# Patient Record
Sex: MALE | Race: WHITE | NOT HISPANIC OR LATINO | Employment: UNEMPLOYED | URBAN - METROPOLITAN AREA
[De-identification: names, ages, dates, MRNs, and addresses within clinical notes are randomized per-mention and may not be internally consistent; named-entity substitution may affect disease eponyms.]

---

## 2019-02-02 ENCOUNTER — HOSPITAL ENCOUNTER (OUTPATIENT)
Facility: OTHER | Age: 59
Discharge: HOME | End: 2019-02-03
Attending: EMERGENCY MEDICINE | Admitting: HOSPITALIST

## 2019-02-02 DIAGNOSIS — R06.00 DYSPNEA: ICD-10-CM

## 2019-02-02 DIAGNOSIS — E78.2 MIXED HYPERLIPIDEMIA: ICD-10-CM

## 2019-02-02 DIAGNOSIS — I50.21 ACUTE SYSTOLIC CONGESTIVE HEART FAILURE: Primary | ICD-10-CM

## 2019-02-02 DIAGNOSIS — I10 ESSENTIAL HYPERTENSION: ICD-10-CM

## 2019-02-02 DIAGNOSIS — Z72.0 TOBACCO ABUSE: ICD-10-CM

## 2019-02-02 DIAGNOSIS — I50.9 CONGESTIVE HEART FAILURE, UNSPECIFIED HF CHRONICITY, UNSPECIFIED HEART FAILURE TYPE: ICD-10-CM

## 2019-02-02 LAB
ALBUMIN SERPL BCP-MCNC: 3.7 G/DL
ALP SERPL-CCNC: 72 U/L
ALT SERPL W/O P-5'-P-CCNC: 33 U/L
ANION GAP SERPL CALC-SCNC: 10 MMOL/L
AST SERPL-CCNC: 51 U/L
BASOPHILS # BLD AUTO: 0.02 K/UL
BASOPHILS NFR BLD: 0.2 %
BILIRUB SERPL-MCNC: 0.6 MG/DL
BNP SERPL-MCNC: 1916 PG/ML
BUN SERPL-MCNC: 18 MG/DL
CALCIUM SERPL-MCNC: 9.1 MG/DL
CHLORIDE SERPL-SCNC: 107 MMOL/L
CO2 SERPL-SCNC: 23 MMOL/L
CREAT SERPL-MCNC: 1.2 MG/DL
DIFFERENTIAL METHOD: ABNORMAL
EOSINOPHIL # BLD AUTO: 0.1 K/UL
EOSINOPHIL NFR BLD: 0.6 %
ERYTHROCYTE [DISTWIDTH] IN BLOOD BY AUTOMATED COUNT: 14.7 %
EST. GFR  (AFRICAN AMERICAN): >60 ML/MIN/1.73 M^2
EST. GFR  (NON AFRICAN AMERICAN): >60 ML/MIN/1.73 M^2
GLUCOSE SERPL-MCNC: 97 MG/DL
HCT VFR BLD AUTO: 38.6 %
HGB BLD-MCNC: 12.8 G/DL
LYMPHOCYTES # BLD AUTO: 1 K/UL
LYMPHOCYTES NFR BLD: 12.3 %
MCH RBC QN AUTO: 31.9 PG
MCHC RBC AUTO-ENTMCNC: 33.2 G/DL
MCV RBC AUTO: 96 FL
MONOCYTES # BLD AUTO: 0.2 K/UL
MONOCYTES NFR BLD: 3 %
NEUTROPHILS # BLD AUTO: 6.8 K/UL
NEUTROPHILS NFR BLD: 83.8 %
PLATELET # BLD AUTO: 251 K/UL
PMV BLD AUTO: 10.1 FL
POTASSIUM SERPL-SCNC: 3.9 MMOL/L
PROT SERPL-MCNC: 6.9 G/DL
RBC # BLD AUTO: 4.01 M/UL
SODIUM SERPL-SCNC: 140 MMOL/L
TROPONIN I SERPL DL<=0.01 NG/ML-MCNC: 0.05 NG/ML
TROPONIN I SERPL DL<=0.01 NG/ML-MCNC: 0.14 NG/ML
TROPONIN I SERPL DL<=0.01 NG/ML-MCNC: 0.17 NG/ML
WBC # BLD AUTO: 8.13 K/UL

## 2019-02-02 PROCEDURE — 83880 ASSAY OF NATRIURETIC PEPTIDE: CPT

## 2019-02-02 PROCEDURE — G0378 HOSPITAL OBSERVATION PER HR: HCPCS

## 2019-02-02 PROCEDURE — 85025 COMPLETE CBC W/AUTO DIFF WBC: CPT

## 2019-02-02 PROCEDURE — 96374 THER/PROPH/DIAG INJ IV PUSH: CPT

## 2019-02-02 PROCEDURE — 84484 ASSAY OF TROPONIN QUANT: CPT | Mod: 91

## 2019-02-02 PROCEDURE — 84484 ASSAY OF TROPONIN QUANT: CPT

## 2019-02-02 PROCEDURE — 80053 COMPREHEN METABOLIC PANEL: CPT

## 2019-02-02 PROCEDURE — 99219 PR INITIAL OBSERVATION CARE,LEVL II: CPT | Mod: ,,, | Performed by: HOSPITALIST

## 2019-02-02 PROCEDURE — 93010 EKG 12-LEAD: ICD-10-PCS | Mod: ,,, | Performed by: INTERNAL MEDICINE

## 2019-02-02 PROCEDURE — 36415 COLL VENOUS BLD VENIPUNCTURE: CPT

## 2019-02-02 PROCEDURE — 25000003 PHARM REV CODE 250: Performed by: HOSPITALIST

## 2019-02-02 PROCEDURE — 93010 ELECTROCARDIOGRAM REPORT: CPT | Mod: ,,, | Performed by: INTERNAL MEDICINE

## 2019-02-02 PROCEDURE — A4216 STERILE WATER/SALINE, 10 ML: HCPCS | Performed by: HOSPITALIST

## 2019-02-02 PROCEDURE — 93005 ELECTROCARDIOGRAM TRACING: CPT

## 2019-02-02 PROCEDURE — 94761 N-INVAS EAR/PLS OXIMETRY MLT: CPT

## 2019-02-02 PROCEDURE — 63600175 PHARM REV CODE 636 W HCPCS: Performed by: HOSPITALIST

## 2019-02-02 PROCEDURE — 99219 PR INITIAL OBSERVATION CARE,LEVL II: ICD-10-PCS | Mod: ,,, | Performed by: HOSPITALIST

## 2019-02-02 PROCEDURE — 63600175 PHARM REV CODE 636 W HCPCS: Performed by: EMERGENCY MEDICINE

## 2019-02-02 PROCEDURE — 99285 EMERGENCY DEPT VISIT HI MDM: CPT | Mod: 25

## 2019-02-02 RX ORDER — ATORVASTATIN CALCIUM 40 MG/1
40 TABLET, FILM COATED ORAL DAILY
COMMUNITY

## 2019-02-02 RX ORDER — ASPIRIN 81 MG/1
81 TABLET ORAL DAILY
COMMUNITY

## 2019-02-02 RX ORDER — SPIRONOLACTONE 25 MG/1
25 TABLET ORAL DAILY
Status: DISCONTINUED | OUTPATIENT
Start: 2019-02-02 | End: 2019-02-03 | Stop reason: HOSPADM

## 2019-02-02 RX ORDER — SODIUM CHLORIDE 0.9 % (FLUSH) 0.9 %
3 SYRINGE (ML) INJECTION EVERY 8 HOURS
Status: DISCONTINUED | OUTPATIENT
Start: 2019-02-02 | End: 2019-02-03 | Stop reason: HOSPADM

## 2019-02-02 RX ORDER — ASPIRIN 81 MG/1
81 TABLET ORAL DAILY
Status: DISCONTINUED | OUTPATIENT
Start: 2019-02-02 | End: 2019-02-03 | Stop reason: HOSPADM

## 2019-02-02 RX ORDER — CARVEDILOL 3.12 MG/1
3.12 TABLET ORAL 2 TIMES DAILY
Status: DISCONTINUED | OUTPATIENT
Start: 2019-02-02 | End: 2019-02-03 | Stop reason: HOSPADM

## 2019-02-02 RX ORDER — ONDANSETRON 2 MG/ML
4 INJECTION INTRAMUSCULAR; INTRAVENOUS EVERY 8 HOURS PRN
Status: DISCONTINUED | OUTPATIENT
Start: 2019-02-02 | End: 2019-02-03 | Stop reason: HOSPADM

## 2019-02-02 RX ORDER — FUROSEMIDE 10 MG/ML
40 INJECTION INTRAMUSCULAR; INTRAVENOUS
Status: COMPLETED | OUTPATIENT
Start: 2019-02-02 | End: 2019-02-02

## 2019-02-02 RX ORDER — SODIUM CHLORIDE 0.9 % (FLUSH) 0.9 %
5 SYRINGE (ML) INJECTION
Status: DISCONTINUED | OUTPATIENT
Start: 2019-02-02 | End: 2019-02-03 | Stop reason: HOSPADM

## 2019-02-02 RX ORDER — FUROSEMIDE 20 MG/1
20 TABLET ORAL 2 TIMES DAILY
COMMUNITY

## 2019-02-02 RX ORDER — ATORVASTATIN CALCIUM 20 MG/1
40 TABLET, FILM COATED ORAL DAILY
Status: DISCONTINUED | OUTPATIENT
Start: 2019-02-02 | End: 2019-02-03 | Stop reason: HOSPADM

## 2019-02-02 RX ORDER — ENOXAPARIN SODIUM 100 MG/ML
1 INJECTION SUBCUTANEOUS EVERY 12 HOURS
Status: DISCONTINUED | OUTPATIENT
Start: 2019-02-03 | End: 2019-02-03 | Stop reason: HOSPADM

## 2019-02-02 RX ORDER — SPIRONOLACTONE 25 MG/1
25 TABLET ORAL DAILY
COMMUNITY

## 2019-02-02 RX ORDER — FUROSEMIDE 10 MG/ML
40 INJECTION INTRAMUSCULAR; INTRAVENOUS 2 TIMES DAILY
Status: DISCONTINUED | OUTPATIENT
Start: 2019-02-02 | End: 2019-02-03 | Stop reason: HOSPADM

## 2019-02-02 RX ORDER — ENOXAPARIN SODIUM 100 MG/ML
40 INJECTION SUBCUTANEOUS EVERY 24 HOURS
Status: DISCONTINUED | OUTPATIENT
Start: 2019-02-02 | End: 2019-02-02

## 2019-02-02 RX ADMIN — Medication 3 ML: at 10:02

## 2019-02-02 RX ADMIN — CARVEDILOL 3.12 MG: 3.12 TABLET, FILM COATED ORAL at 03:02

## 2019-02-02 RX ADMIN — LOSARTAN POTASSIUM 12.5 MG: 25 TABLET, FILM COATED ORAL at 03:02

## 2019-02-02 RX ADMIN — SPIRONOLACTONE 25 MG: 25 TABLET ORAL at 03:02

## 2019-02-02 RX ADMIN — FUROSEMIDE 40 MG: 10 INJECTION, SOLUTION INTRAVENOUS at 05:02

## 2019-02-02 RX ADMIN — FUROSEMIDE 40 MG: 10 INJECTION, SOLUTION INTRAVENOUS at 07:02

## 2019-02-02 RX ADMIN — ENOXAPARIN SODIUM 40 MG: 100 INJECTION SUBCUTANEOUS at 05:02

## 2019-02-02 RX ADMIN — ATORVASTATIN CALCIUM 40 MG: 20 TABLET, FILM COATED ORAL at 03:02

## 2019-02-02 RX ADMIN — ASPIRIN 81 MG: 81 TABLET, COATED ORAL at 03:02

## 2019-02-02 RX ADMIN — CARVEDILOL 3.12 MG: 3.12 TABLET, FILM COATED ORAL at 09:02

## 2019-02-02 NOTE — ED NOTES
Assumed care of pt from DM Martinez. Pt resting in stretcher in NAD with even, unlabored respirations. Spo2 92% on RA, pt denies NC at this time. Urinal at bedside. Pt reports SOB improved, denies chest pain.

## 2019-02-02 NOTE — PLAN OF CARE
Problem: Adult Inpatient Plan of Care  Goal: Plan of Care Review  Outcome: Ongoing (interventions implemented as appropriate)  Patient arrived to room. Admit assessment completed. Plan of care discussed with patient. Provided with towels for shower.  Patient states that [place] currently.  Instructed that he is not allowed to smoke in the room, unit and cannot leave unit.  Will monitor

## 2019-02-02 NOTE — ED NOTES
"Pt states he is breathing better since urinating "all that fluid out". No obvious respiratory distress observed. +DUNCAN. Will continue to monitor closely.  "

## 2019-02-02 NOTE — ED NOTES
Since returning from x- ray, pt states he is breathing better and doesn't need the 02. Pt was informed his 02 sats are a little low and the 02 would benefit him. Pt still refused to use the 02. Dr. Goldman was notified.

## 2019-02-02 NOTE — ASSESSMENT & PLAN NOTE
Will give Lasix 40 mg IV q.12  Continue spironolactone 25 mg daily  Strict I/Os  Trend troponin I levels  Systolic CHF suspected, unable to obtain 2D echocardiogram for confirmation

## 2019-02-02 NOTE — ED NOTES
Spo2 91%, pt refusing to put oxygen on to improve sats. Pt in NAD with even, unlabored respirations.

## 2019-02-02 NOTE — ED PROVIDER NOTES
Encounter Date: 2/2/2019    SCRIBE #1 NOTE: IBree, am scribing for, and in the presence of, Dr. Saenz.       History     Chief Complaint   Patient presents with    Shortness of Breath     Pt came to the ED tonight c.o. shortness of breath x 2 days getting worse.      Time seen by provider: 4:58 AM    This is a 59 y.o. male who presents to the ED via EMS with complaint of shortness of breath for the past two days, worse tonight with activity and laying flat. Patient reports cough with sputum starting tonight and increased fatigue with ambulating. Patient denies fever, wheezing, chest pain or leg swelling. Patient has not been compliant with any medications. Patient reports history of similar symptoms with possible fluid on lungs and was administered fluid pills. Patient reports history of HTN and possibly borderline diabetic.       The history is provided by the patient.     Review of patient's allergies indicates:  No Known Allergies  No past medical history on file.  No past surgical history on file.  No family history on file.  Social History     Tobacco Use    Smoking status: Not on file   Substance Use Topics    Alcohol use: Not on file    Drug use: Not on file     Review of Systems   Constitutional: Positive for fatigue. Negative for fever.   HENT: Negative for sore throat.    Respiratory: Positive for cough and shortness of breath. Negative for wheezing.    Cardiovascular: Negative for chest pain and leg swelling.   Gastrointestinal: Negative for nausea.   Genitourinary: Negative for dysuria.   Musculoskeletal: Negative for back pain.   Skin: Negative for rash.   Neurological: Negative for weakness.   Hematological: Does not bruise/bleed easily.       Physical Exam     Initial Vitals [02/02/19 0400]   BP Pulse Resp Temp SpO2   (!) 159/99 (!) 116 20 97.5 °F (36.4 °C) 100 %      MAP       --         Physical Exam    Nursing note and vitals reviewed.  Constitutional: He appears well-developed  and well-nourished. He is not diaphoretic. No distress.   HENT:   Head: Normocephalic and atraumatic.   Eyes: Conjunctivae and EOM are normal.   Neck: Normal range of motion. Neck supple.   Cardiovascular: Regular rhythm and normal heart sounds. Tachycardia present.  Exam reveals no gallop and no friction rub.    No murmur heard.  Pulmonary/Chest: No respiratory distress. He has no wheezes. He has no rhonchi. He has no rales.   Decreased breath sounds diffusely, worse at lung bases.   Abdominal: Soft. Bowel sounds are normal. He exhibits no distension. There is no tenderness. There is no rebound and no guarding.   Musculoskeletal: Normal range of motion. He exhibits no edema or tenderness.   Neurological: He is alert and oriented to person, place, and time.   Skin: Skin is warm and dry. No rash noted. No erythema. No pallor.   Psychiatric: He has a normal mood and affect. His behavior is normal. Judgment and thought content normal.         ED Course   Procedures  Labs Reviewed   CBC W/ AUTO DIFFERENTIAL - Abnormal; Notable for the following components:       Result Value    RBC 4.01 (*)     Hemoglobin 12.8 (*)     Hematocrit 38.6 (*)     MCH 31.9 (*)     RDW 14.7 (*)     Mono # 0.2 (*)     Gran% 83.8 (*)     Lymph% 12.3 (*)     Mono% 3.0 (*)     All other components within normal limits   COMPREHENSIVE METABOLIC PANEL - Abnormal; Notable for the following components:    AST 51 (*)     All other components within normal limits   B-TYPE NATRIURETIC PEPTIDE - Abnormal; Notable for the following components:    BNP 1,916 (*)     All other components within normal limits   TROPONIN I - Abnormal; Notable for the following components:    Troponin I 0.046 (*)     All other components within normal limits          Imaging Results          X-Ray Chest PA And Lateral (Final result)  Result time 02/02/19 05:48:50    Final result by Adam Elizondo MD (02/02/19 05:48:50)                 Impression:      Bilateral interstitial  opacities with prominence of the central pulmonary vasculature favored to relate to edema/CHF with atypical interstitial infection not excluded.  Clinical correlation advised.      Electronically signed by: Adam Elizondo MD  Date:    02/02/2019  Time:    05:48             Narrative:    EXAMINATION:  XR CHEST PA AND LATERAL    CLINICAL HISTORY:  Dyspnea, unspecified    TECHNIQUE:  PA and lateral views of the chest were performed.    COMPARISON:  None    FINDINGS:  Cardiac monitoring leads overlie the chest.  The cardiomediastinal silhouette appears within normal limits.  The lungs demonstrate bilateral interstitial opacities.  There is prominence of the central pulmonary vasculature.  No large pleural effusion appreciated.  There is no pneumothorax.  There is a chronic appearing deformity of the left posterior 7th rib.  Remaining visualized osseous structures appear grossly intact.                                 Medical Decision Making:   Initial Assessment:       59-year-old male history of hypertension presents with worsening SOB over the past 2 days, tonight worse with activity and lying flat.  Patient is poor historian and not compliant with hypertension meds.  Exam with the diffusely decreased breath sounds worse at lung bases.  He then states that he has had fluid in his lungs before, and occasionally takes fluid pills when he needs to.  He took to them earlier tonight, Lasix 20 mg pills.  No peripheral edema, chest pain, or other concerning exam findings.      EKG with inverted T-waves V5, V6, inferiorly, with signs of LVH anteriorly.  No active CP or known history of CAD per patient.  ED workup with BNP 1916 and slightly elevated troponin, with chest x-ray consistent with CHF exacerbation.  After he took Lasix at home he did feel some improvement with good urine output, but still with DUNCAN and mild hypoxia.  Will give additional IV Lasix and admit to hospitalist for IV diuresis, serial troponins, and  further management of CHF exacerbation.                Scribe Attestation:   Scribe #1: I performed the above scribed service and the documentation accurately describes the services I performed. I attest to the accuracy of the note.    Attending Attestation:           Physician Attestation for Scribe:  Physician Attestation Statement for Scribe #1: I, Dr. Saenz, reviewed documentation, as scribed by Bree Abarca in my presence, and it is both accurate and complete.                    Clinical Impression:     1. Congestive heart failure, unspecified HF chronicity, unspecified heart failure type    2. Dyspnea                                   Fracisco Saenz MD  02/02/19 0721

## 2019-02-02 NOTE — ED NOTES
Pt c/o SOB since 2300 hrs last night. Pt states he took 2 of his fluid pills when he became SOB. Pt also states he feels like he has to cough something up. Pt states he smokes < 1 pack of cigarettes/day. Pt is A & O x 3, + SOB w/out use of accessory muscles and no nasal flaring. Skin is warm, dry and pink. VS. OLIVERIO x 3mm. BBS- CTA. Abd- SNT. PSM x 4 exts. No pedal edema observed. Pt is connected to the pulse ox, B/P cuff and EKG monitor. Bed locked and in the low position w/ the side rails up and locked for pt safety. Call bell @ the BS. Will continue to monitor closely.

## 2019-02-02 NOTE — H&P
[facility] Hospital Medicine  History & Physical    Patient Name: Mayank Medellin  MRN: [id number]  Admission Date: 2/2/2019  Attending Physician: Anum Sam MD   Primary Care Provider: Primary Doctor No         Patient information was obtained from patient and ER records.     Subjective:     Principal Problem:Acute systolic congestive heart failure    Chief Complaint:   Chief Complaint   Patient presents with    Shortness of Breath     Pt came to the ED tonight c.o. shortness of breath x 2 days getting worse.         HPI: This is a 59-year-old male with hypertension, hyperlipidemia, and suspected CHF who presents with complaints of 2 weeks of worsening shortness of breath.  The patient notes that shortness of breath worsened over the past 2 days but was not associated with chest pain, nausea, or lower extremity edema. The patient  lso denies orthopnea or PND.  Upon presentation to the ED, a BNP was obtained and significant for a value of 1916.  Initial troponin I was mildly elevated at 0.046.  A chest x-ray was obtained and showed bilateral interstitial opacities with prominence of the central pulmonary vasculature consistent with edema/CHF.    Past Medical History:   Diagnosis Date    CHF (congestive heart failure)     Hypertension        History reviewed. No pertinent surgical history.    Review of patient's allergies indicates:   Allergen Reactions    Penicillins      hives       No current facility-administered medications on file prior to encounter.      Current Outpatient Medications on File Prior to Encounter   Medication Sig    furosemide (LASIX) 20 MG tablet Take 20 mg by mouth 2 (two) times daily.    spironolactone (ALDACTONE) 25 MG tablet Take 25 mg by mouth once daily.    aspirin (ECOTRIN) 81 MG EC tablet Take 81 mg by mouth once daily.    atorvastatin (LIPITOR) 40 MG tablet Take 40 mg by mouth once daily.     Family History     None        Tobacco Use    Smoking status:  Current Every Day Smoker     Packs/day: 1.50     Years: 45.00     Pack years: 67.50    Smokeless tobacco: Never Used   Substance and Sexual Activity    Alcohol use: No     Frequency: Never    Drug use: No    Sexual activity: Not on file     Review of Systems   Constitutional: Positive for activity change and fatigue. Negative for appetite change and fever.   HENT: Negative for congestion and postnasal drip.    Eyes: Negative for visual disturbance.   Respiratory: Positive for cough and shortness of breath.    Cardiovascular: Negative for chest pain and leg swelling.   Gastrointestinal: Negative for abdominal pain, constipation, diarrhea, nausea and vomiting.   Endocrine: Negative for cold intolerance and heat intolerance.   Genitourinary: Negative for difficulty urinating and dysuria.   Musculoskeletal: Negative for arthralgias.   Skin: Negative for rash.   Neurological: Negative for dizziness, weakness and headaches.   Hematological: Negative for adenopathy.   Psychiatric/Behavioral: Negative for agitation, behavioral problems and confusion. The patient is not nervous/anxious.      Objective:     Vital Signs (Most Recent):  Temp: 97 °F (36.1 °C) (02/02/19 1151)  Pulse: 96 (02/02/19 1200)  Resp: 16 (02/02/19 1151)  BP: (!) 140/88 (02/02/19 1151)  SpO2: 98 % (02/02/19 1151) Vital Signs (24h Range):  Temp:  [97 °F (36.1 °C)-97.6 °F (36.4 °C)] 97 °F (36.1 °C)  Pulse:  [] 96  Resp:  [16-27] 16  SpO2:  [90 %-100 %] 98 %  BP: (140-179)/() 140/88     Weight: 54.3 kg (119 lb 11.4 oz)  Body mass index is 19.92 kg/m².    Physical Exam   Constitutional: He is oriented to person, place, and time. He appears well-developed and well-nourished.   Unkept male in no acute distress cooperative with examination   HENT:   Head: Normocephalic and atraumatic.   Mouth/Throat: Oropharynx is clear and moist.   Eyes: EOM are normal. Pupils are equal, round, and reactive to light.   Neck: Normal range of motion. Neck supple.  JVD present. No thyromegaly present.   Cardiovascular: Normal rate, regular rhythm, normal heart sounds and intact distal pulses. Exam reveals no gallop and no friction rub.   No murmur heard.  Pulmonary/Chest: Effort normal.   Decreased breath sounds at the bases   Abdominal: Soft. Bowel sounds are normal. He exhibits no distension. There is no tenderness. There is no guarding.   Musculoskeletal: Normal range of motion. He exhibits no edema.   Lymphadenopathy:     He has no cervical adenopathy.   Neurological: He is alert and oriented to person, place, and time.   Skin: Skin is warm and dry.   Psychiatric: He has a normal mood and affect. His behavior is normal. Judgment and thought content normal.         CRANIAL NERVES     CN III, IV, VI   Pupils are equal, round, and reactive to light.  Extraocular motions are normal.         Significant Labs:   CBC:   Recent Labs   Lab 02/02/19  0510   WBC 8.13   HGB 12.8*   HCT 38.6*        CMP:   Recent Labs   Lab 02/02/19  0510      K 3.9      CO2 23   GLU 97   BUN 18   CREATININE 1.2   CALCIUM 9.1   PROT 6.9   ALBUMIN 3.7   BILITOT 0.6   ALKPHOS 72   AST 51*   ALT 33   ANIONGAP 10   EGFRNONAA >60     Troponin:   Recent Labs   Lab 02/02/19  0510   TROPONINI 0.046*       Significant Imaging: I have reviewed and interpreted all pertinent imaging results/findings within the past 24 hours.    Assessment/Plan:     * Acute systolic congestive heart failure    Will give Lasix 40 mg IV q.12  Continue spironolactone 25 mg daily  Strict I/Os  Trend troponin I levels  Systolic CHF suspected, unable to obtain 2D echocardiogram for confirmation       Essential hypertension    Start Coreg 3.125 mg p.o. b.i.d.  Start losartan 12.5 mg p.o. q.day  Continue to monitor       Tobacco abuse    Will place nicotine patch 21 mg daily       Mixed hyperlipidemia    Continue Lipitor 40 mg p.o. q.day         VTE Risk Mitigation (From admission, onward)        Ordered     enoxaparin  injection 40 mg  Daily      02/02/19 1421     IP VTE HIGH RISK PATIENT  Once      02/02/19 0937             Anum Sam MD  Department of Hospital Medicine   [facility]

## 2019-02-02 NOTE — ED NOTES
Dr. Goldman aware of trending BPs, okay to transport pt to floor. Pt admits history of HTN but does not take BP meds

## 2019-02-02 NOTE — SUBJECTIVE & OBJECTIVE
Past Medical History:   Diagnosis Date    CHF (congestive heart failure)     Hypertension        History reviewed. No pertinent surgical history.    Review of patient's allergies indicates:   Allergen Reactions    Penicillins      hives       No current facility-administered medications on file prior to encounter.      Current Outpatient Medications on File Prior to Encounter   Medication Sig    furosemide (LASIX) 20 MG tablet Take 20 mg by mouth 2 (two) times daily.    spironolactone (ALDACTONE) 25 MG tablet Take 25 mg by mouth once daily.    aspirin (ECOTRIN) 81 MG EC tablet Take 81 mg by mouth once daily.    atorvastatin (LIPITOR) 40 MG tablet Take 40 mg by mouth once daily.     Family History     None        Tobacco Use    Smoking status: Current Every Day Smoker     Packs/day: 1.50     Years: 45.00     Pack years: 67.50    Smokeless tobacco: Never Used   Substance and Sexual Activity    Alcohol use: No     Frequency: Never    Drug use: No    Sexual activity: Not on file     Review of Systems   Constitutional: Positive for activity change and fatigue. Negative for appetite change and fever.   HENT: Negative for congestion and postnasal drip.    Eyes: Negative for visual disturbance.   Respiratory: Positive for cough and shortness of breath.    Cardiovascular: Negative for chest pain and leg swelling.   Gastrointestinal: Negative for abdominal pain, constipation, diarrhea, nausea and vomiting.   Endocrine: Negative for cold intolerance and heat intolerance.   Genitourinary: Negative for difficulty urinating and dysuria.   Musculoskeletal: Negative for arthralgias.   Skin: Negative for rash.   Neurological: Negative for dizziness, weakness and headaches.   Hematological: Negative for adenopathy.   Psychiatric/Behavioral: Negative for agitation, behavioral problems and confusion. The patient is not nervous/anxious.      Objective:     Vital Signs (Most Recent):  Temp: 97 °F (36.1 °C) (02/02/19  1151)  Pulse: 96 (02/02/19 1200)  Resp: 16 (02/02/19 1151)  BP: (!) 140/88 (02/02/19 1151)  SpO2: 98 % (02/02/19 1151) Vital Signs (24h Range):  Temp:  [97 °F (36.1 °C)-97.6 °F (36.4 °C)] 97 °F (36.1 °C)  Pulse:  [] 96  Resp:  [16-27] 16  SpO2:  [90 %-100 %] 98 %  BP: (140-179)/() 140/88     Weight: 54.3 kg (119 lb 11.4 oz)  Body mass index is 19.92 kg/m².    Physical Exam   Constitutional: He is oriented to person, place, and time. He appears well-developed and well-nourished.   Unkept male in no acute distress cooperative with examination   HENT:   Head: Normocephalic and atraumatic.   Mouth/Throat: Oropharynx is clear and moist.   Eyes: EOM are normal. Pupils are equal, round, and reactive to light.   Neck: Normal range of motion. Neck supple. JVD present. No thyromegaly present.   Cardiovascular: Normal rate, regular rhythm, normal heart sounds and intact distal pulses. Exam reveals no gallop and no friction rub.   No murmur heard.  Pulmonary/Chest: Effort normal.   Decreased breath sounds at the bases   Abdominal: Soft. Bowel sounds are normal. He exhibits no distension. There is no tenderness. There is no guarding.   Musculoskeletal: Normal range of motion. He exhibits no edema.   Lymphadenopathy:     He has no cervical adenopathy.   Neurological: He is alert and oriented to person, place, and time.   Skin: Skin is warm and dry.   Psychiatric: He has a normal mood and affect. His behavior is normal. Judgment and thought content normal.         CRANIAL NERVES     CN III, IV, VI   Pupils are equal, round, and reactive to light.  Extraocular motions are normal.         Significant Labs:   CBC:   Recent Labs   Lab 02/02/19  0510   WBC 8.13   HGB 12.8*   HCT 38.6*        CMP:   Recent Labs   Lab 02/02/19  0510      K 3.9      CO2 23   GLU 97   BUN 18   CREATININE 1.2   CALCIUM 9.1   PROT 6.9   ALBUMIN 3.7   BILITOT 0.6   ALKPHOS 72   AST 51*   ALT 33   ANIONGAP 10   EGFRNONAA >60      Troponin:   Recent Labs   Lab 02/02/19  0510   TROPONINI 0.046*       Significant Imaging: I have reviewed and interpreted all pertinent imaging results/findings within the past 24 hours.

## 2019-02-02 NOTE — HPI
This is a 59-year-old male with hypertension, hyperlipidemia, and suspected CHF who presents with complaints of 2 weeks of worsening shortness of breath.  The patient notes that shortness of breath worsened over the past 2 days but was not associated with chest pain, nausea, or lower extremity edema. The patient  lso denies orthopnea or PND.  Upon presentation to the ED, a BNP was obtained and significant for a value of 1916.  Initial troponin I was mildly elevated at 0.046.  A chest x-ray was obtained and showed bilateral interstitial opacities with prominence of the central pulmonary vasculature consistent with edema/CHF.

## 2019-02-03 VITALS
DIASTOLIC BLOOD PRESSURE: 76 MMHG | HEART RATE: 75 BPM | BODY MASS INDEX: 19.94 KG/M2 | RESPIRATION RATE: 18 BRPM | TEMPERATURE: 97 F | HEIGHT: 65 IN | SYSTOLIC BLOOD PRESSURE: 122 MMHG | OXYGEN SATURATION: 98 % | WEIGHT: 119.69 LBS

## 2019-02-03 LAB
ALBUMIN SERPL BCP-MCNC: 3.5 G/DL
ALP SERPL-CCNC: 68 U/L
ALT SERPL W/O P-5'-P-CCNC: 20 U/L
ANION GAP SERPL CALC-SCNC: 11 MMOL/L
AST SERPL-CCNC: 17 U/L
BASOPHILS # BLD AUTO: 0.01 K/UL
BASOPHILS NFR BLD: 0.2 %
BILIRUB SERPL-MCNC: 0.7 MG/DL
BUN SERPL-MCNC: 16 MG/DL
CALCIUM SERPL-MCNC: 9.2 MG/DL
CHLORIDE SERPL-SCNC: 98 MMOL/L
CHOLEST SERPL-MCNC: 207 MG/DL
CHOLEST/HDLC SERPL: 4.5 {RATIO}
CO2 SERPL-SCNC: 26 MMOL/L
CREAT SERPL-MCNC: 0.8 MG/DL
DIFFERENTIAL METHOD: ABNORMAL
EOSINOPHIL # BLD AUTO: 0.2 K/UL
EOSINOPHIL NFR BLD: 2.9 %
ERYTHROCYTE [DISTWIDTH] IN BLOOD BY AUTOMATED COUNT: 14.5 %
EST. GFR  (AFRICAN AMERICAN): >60 ML/MIN/1.73 M^2
EST. GFR  (NON AFRICAN AMERICAN): >60 ML/MIN/1.73 M^2
GLUCOSE SERPL-MCNC: 83 MG/DL
HCT VFR BLD AUTO: 41.1 %
HDLC SERPL-MCNC: 46 MG/DL
HDLC SERPL: 22.2 %
HGB BLD-MCNC: 13.9 G/DL
LDLC SERPL CALC-MCNC: 128.2 MG/DL
LYMPHOCYTES # BLD AUTO: 1.9 K/UL
LYMPHOCYTES NFR BLD: 32.2 %
MAGNESIUM SERPL-MCNC: 2.5 MG/DL
MCH RBC QN AUTO: 32 PG
MCHC RBC AUTO-ENTMCNC: 33.8 G/DL
MCV RBC AUTO: 95 FL
MONOCYTES # BLD AUTO: 0.7 K/UL
MONOCYTES NFR BLD: 12.3 %
NEUTROPHILS # BLD AUTO: 3 K/UL
NEUTROPHILS NFR BLD: 52.4 %
NONHDLC SERPL-MCNC: 161 MG/DL
PHOSPHATE SERPL-MCNC: 4.2 MG/DL
PLATELET # BLD AUTO: 269 K/UL
PMV BLD AUTO: 10.4 FL
POTASSIUM SERPL-SCNC: 3.6 MMOL/L
PROT SERPL-MCNC: 6.6 G/DL
RBC # BLD AUTO: 4.34 M/UL
SODIUM SERPL-SCNC: 135 MMOL/L
TRIGL SERPL-MCNC: 164 MG/DL
WBC # BLD AUTO: 5.77 K/UL

## 2019-02-03 PROCEDURE — 80053 COMPREHEN METABOLIC PANEL: CPT

## 2019-02-03 PROCEDURE — 63600175 PHARM REV CODE 636 W HCPCS: Performed by: NURSE PRACTITIONER

## 2019-02-03 PROCEDURE — 99217 PR OBSERVATION CARE DISCHARGE: CPT | Mod: ,,, | Performed by: HOSPITALIST

## 2019-02-03 PROCEDURE — 84100 ASSAY OF PHOSPHORUS: CPT

## 2019-02-03 PROCEDURE — 94761 N-INVAS EAR/PLS OXIMETRY MLT: CPT

## 2019-02-03 PROCEDURE — 36415 COLL VENOUS BLD VENIPUNCTURE: CPT

## 2019-02-03 PROCEDURE — 85025 COMPLETE CBC W/AUTO DIFF WBC: CPT

## 2019-02-03 PROCEDURE — 99217 PR OBSERVATION CARE DISCHARGE: ICD-10-PCS | Mod: ,,, | Performed by: HOSPITALIST

## 2019-02-03 PROCEDURE — 80061 LIPID PANEL: CPT

## 2019-02-03 PROCEDURE — A4216 STERILE WATER/SALINE, 10 ML: HCPCS | Performed by: HOSPITALIST

## 2019-02-03 PROCEDURE — 83735 ASSAY OF MAGNESIUM: CPT

## 2019-02-03 PROCEDURE — 63600175 PHARM REV CODE 636 W HCPCS: Performed by: HOSPITALIST

## 2019-02-03 PROCEDURE — 25000003 PHARM REV CODE 250: Performed by: HOSPITALIST

## 2019-02-03 PROCEDURE — G0378 HOSPITAL OBSERVATION PER HR: HCPCS

## 2019-02-03 RX ORDER — CARVEDILOL 3.12 MG/1
3.12 TABLET ORAL 2 TIMES DAILY WITH MEALS
Qty: 60 TABLET | Refills: 3 | Status: ON HOLD | OUTPATIENT
Start: 2019-02-03 | End: 2020-02-08 | Stop reason: HOSPADM

## 2019-02-03 RX ORDER — LOSARTAN POTASSIUM 25 MG/1
12.5 TABLET ORAL DAILY
Qty: 45 TABLET | Refills: 3 | Status: ON HOLD | OUTPATIENT
Start: 2019-02-04 | End: 2020-02-08 | Stop reason: SDUPTHER

## 2019-02-03 RX ADMIN — ATORVASTATIN CALCIUM 40 MG: 20 TABLET, FILM COATED ORAL at 08:02

## 2019-02-03 RX ADMIN — LOSARTAN POTASSIUM 12.5 MG: 25 TABLET, FILM COATED ORAL at 08:02

## 2019-02-03 RX ADMIN — FUROSEMIDE 40 MG: 10 INJECTION, SOLUTION INTRAVENOUS at 08:02

## 2019-02-03 RX ADMIN — CARVEDILOL 3.12 MG: 3.12 TABLET, FILM COATED ORAL at 08:02

## 2019-02-03 RX ADMIN — SPIRONOLACTONE 25 MG: 25 TABLET ORAL at 08:02

## 2019-02-03 RX ADMIN — Medication 3 ML: at 08:02

## 2019-02-03 RX ADMIN — ENOXAPARIN SODIUM 50 MG: 100 INJECTION SUBCUTANEOUS at 08:02

## 2019-02-03 RX ADMIN — ASPIRIN 81 MG: 81 TABLET, COATED ORAL at 08:02

## 2019-02-03 NOTE — PLAN OF CARE
Patient is discharged back to the community.     No CM needs for discharge.        02/03/19 0954   Final Note   Assessment Type Final Discharge Note   Anticipated Discharge Disposition Home   What phone number can be called within the next 1-3 days to see how you are doing after discharge? [phone]   Right Care Referral Info   Post Acute Recommendation No Care

## 2019-02-03 NOTE — DISCHARGE SUMMARY
[facility] Hospital Medicine  Discharge Summary      Patient Name: Mayank Medellin  MRN: [id number]  Admission Date: 2/2/2019  Hospital Length of Stay: 0 days  Discharge Date and Time:  02/03/2019 9:54 AM  Attending Physician: Anum Sam MD   Discharging Provider: Anum Sam MD  Primary Care Provider: Primary Doctor No      HPI:   This is a 59-year-old male with hypertension, hyperlipidemia, and suspected CHF who presents with complaints of 2 weeks of worsening shortness of breath.  The patient notes that shortness of breath worsened over the past 2 days but was not associated with chest pain, nausea, or lower extremity edema. The patient  lso denies orthopnea or PND.  Upon presentation to the ED, a BNP was obtained and significant for a value of 1916.  Initial troponin I was mildly elevated at 0.046.  A chest x-ray was obtained and showed bilateral interstitial opacities with prominence of the central pulmonary vasculature consistent with edema/CHF.    * No surgery found *      Hospital Course:   The patient was diuresed over 4 L of fluid with IV Lasix.  The patient has a suspected diagnosis of systolic heart failure, 2D echo was unavailable to confirm this diagnosis.  Losartan 12.5 mg daily and Coreg 3.125 mg p.o. b.i.d. were added to the patient's medication regimen.  The patient will continue Lasix 20 mg daily and spironolactone 25 mg p.o. daily upon discharge. He is instructed to follow up with his primary care physician within 1 week.     Consults:     No new Assessment & Plan notes have been filed under this hospital service since the last note was generated.  Service: Hospital Medicine    Final Active Diagnoses:    Diagnosis Date Noted POA    PRINCIPAL PROBLEM:  Acute systolic congestive heart failure [I50.21] 02/02/2019 Yes    Essential hypertension [I10] 02/02/2019 Yes    Tobacco abuse [Z72.0] 02/02/2019 Yes    Mixed hyperlipidemia [E78.2] 02/02/2019 Yes      Problems Resolved  During this Admission:       Discharged Condition: good    Disposition: Home or Self Care    Follow Up:  Follow-up Information     Primary Doctor No In 1 week.               Patient Instructions:      Diet Adult Regular     Order Specific Question Answer Comments   Na restriction, if any: 2gNa      Activity as tolerated       Significant Diagnostic Studies: Labs:   CMP   Recent Labs   Lab 02/02/19  0510 02/03/19  0547    135*   K 3.9 3.6    98   CO2 23 26   GLU 97 83   BUN 18 16   CREATININE 1.2 0.8   CALCIUM 9.1 9.2   PROT 6.9 6.6   ALBUMIN 3.7 3.5   BILITOT 0.6 0.7   ALKPHOS 72 68   AST 51* 17   ALT 33 20   ANIONGAP 10 11   ESTGFRAFRICA >60 >60   EGFRNONAA >60 >60   , CBC   Recent Labs   Lab 02/02/19  0510 02/03/19  0547   WBC 8.13 5.77   HGB 12.8* 13.9*   HCT 38.6* 41.1    269   , Lipid Panel   Lab Results   Component Value Date    CHOL 207 (H) 02/03/2019    HDL 46 02/03/2019    LDLCALC 128.2 02/03/2019    TRIG 164 (H) 02/03/2019    CHOLHDL 22.2 02/03/2019    and Troponin   Recent Labs   Lab 02/02/19 2015   TROPONINI 0.139*       Pending Diagnostic Studies:     None         Medications:  Reconciled Home Medications:      Medication List      START taking these medications    carvedilol 3.125 MG tablet  Commonly known as:  COREG  Take 1 tablet (3.125 mg total) by mouth 2 (two) times daily with meals.     losartan 25 MG tablet  Commonly known as:  COZAAR  Take 0.5 tablets (12.5 mg total) by mouth once daily.  Start taking on:  2/4/2019        CONTINUE taking these medications    aspirin 81 MG EC tablet  Commonly known as:  ECOTRIN  Take 81 mg by mouth once daily.     atorvastatin 40 MG tablet  Commonly known as:  LIPITOR  Take 40 mg by mouth once daily.     furosemide 20 MG tablet  Commonly known as:  LASIX  Take 20 mg by mouth 2 (two) times daily.     spironolactone 25 MG tablet  Commonly known as:  ALDACTONE  Take 25 mg by mouth once daily.            Indwelling Lines/Drains at time of  discharge:   Lines/Drains/Airways          None          Time spent on the discharge of patient: 20 minutes  Patient was seen and examined on the date of discharge and determined to be suitable for discharge.         Anum Sam MD  Department of Hospital Medicine  [facility]

## 2019-02-03 NOTE — HOSPITAL COURSE
The patient was diuresed over 4 L of fluid with IV Lasix.  The patient has a suspected diagnosis of systolic heart failure, 2D echo was unavailable to confirm this diagnosis.  Losartan 12.5 mg daily and Coreg 3.125 mg p.o. b.i.d. were added to the patient's medication regimen.  The patient will continue Lasix 20 mg daily and spironolactone 25 mg p.o. daily upon discharge. He is instructed to follow up with his primary care physician within 1 week.

## 2019-02-03 NOTE — DISCHARGE INSTRUCTIONS
Discharge Instructions for Heart Failure  The heart is a muscle that pumps oxygen-rich blood to all parts of the body. When you have heart failure, the heart is not able to pump as well as it should. Blood and fluid may back up into the lungs (congestive heart failure), and some parts of the body dont get enough oxygen-rich blood to work normally. These problems lead to the symptoms of heart failure. Heart failure can occur due to an injury to the heart or from natural processes.  You can control symptoms of heart failure with some lifestyle changes and by following your doctor's advice.  Home care  Activity  Ask your healthcare provider about an exercise program. You can benefit from simple activities such as walking or gardening. Exercising most days of the week can make you feel better. Don't be discouraged if your progress is slow at first. Rest as needed. Stop activity if you develop symptoms such as chest pain, lightheadedness, or significant shortness of breath. Find activities that you enjoy, such as brisk walking, dancing, swimming, or gardening. These will help you stay active and strengthen your heart.  Diet  Follow a heart healthy diet. And make sure to limit the salt (sodium) in your diet. Salt causes your body to hold water. This makes your heart work harder as there is more fluid for the heart to pump. Limit your salt by doing the following:  · Limit canned, dried, packaged, and fast foods.  · Don't add salt to your food.  · Season foods with herbs instead of salt.  · Watch how much liquids you drink. Drinking too much can make heart failure worse. Talk with your health care provider about how much you should drink each day.  · Limit the amount of alcohol you drink. It may harm your heart. Women should have no more than 1 drink a day and men should have no more than 2.  · When you eat out, request that your meals have no added salt.  Tobacco  If you smoke, it's very important to quit. Smoking  increases your chances of having a heart attack by harming the blood vessels that provide oxygen to your heart. This makes heart failure worse. Quitting smoking is the number one thing you can do to improve your health. Enroll in a stop-smoking program to improve your chances of success. Talk with your healthcare provider about medicines or nicotine replacement therapy to help you quit smoking. Ask your healthcare provider about smoking cessation support groups.  Medicine  Take your medicines exactly as prescribed. Learn the names and purpose of each of your medicines. Keep an accurate medicine list and current dosages with you at all times. Don't skip doses. If you miss a dose of your medicine, take it as soon as you remember. If you miss a dose and it's almost time for your next dose, just wait and take your next dose at the normal time. Don't take a double dose. If you are unsure, call your doctor's office. Make sure not to mix up your medicines or forget what you've taken the same day.  Weight monitoring  Weigh yourself every day. A sudden weight gain can mean your heart failure is getting worse. Weigh yourself at the same time of day and in the same kind of clothes. Ideally, weigh yourself first thing in the morning after you empty your bladder, but before you eat breakfast. Your healthcare provider will show you how to track your weight. He or she will also discuss with you when you should call if you have a sudden, unexpected increase in your weight.  In general, your healthcare provider may ask you to report if your weight goes up by more than 2 pounds in 1 day,  5 pounds in 1 week, or whatever weight gain you were told by your doctor. This is a sign that you are retaining more fluid than you should be. Clues to weight gain include checking your ankles for swelling, or noticing you are short of breath when you lie down.  Follow-up care  Make a follow-up appointment as directed. Depending on the type and  severity of heart failure you have, you may need follow-up as early as 7 days from hospital discharge. Keep appointments for checkups and lab tests that are needed to check your medicines and condition.  Recognize that your health and even survival depend on your following medical recommendations.  Symptoms  Heart failure can cause a variety of symptoms, including:  · Shortness of breath  · Trouble breathing at night, especially when you lie down  · Swelling in the legs and feet or in the belly (abdomen)  · Becoming easily fatigued  · Irregular or rapid heartbeat  · Weakness or lightheadedness  · Swelling of the neck veins  It is important to know what to do if symptoms get worse or if you develop signs of worsening heart failure.     When to see your healthcare provider  Call your doctor right away if you have any of these signs of worsening heart failure:  · Sudden weight gain (more than 2 pounds in 1 day or 5 pounds in 1 week, or whatever weight gain you were told to report by your doctor)  · Trouble breathing not related to being active  · New or increased swelling of your legs or ankles  · Swelling or pain in your abdomen  · Breathing trouble at night (waking up short of breath, needing more pillows to breathe)  · Frequent coughing that doesn't go away  · Feeling much more tired than usual  Call 911  Call 911 right away if you have:  · Severe shortness of breath, such that you can't catch your breath even while resting  · Severe chest pain that does not resolve with rest or nitroglycerin  · Pink, foamy mucus with cough and shortness of breath  · A continuous rapid or irregular heartbeat  · Passing out or fainting  · Stroke symptoms such as sudden numbness or weakness on one side of your face, arm, or leg or sudden confusion, trouble speaking or vision changes   Date Last Reviewed: 3/21/2016  © 4547-8765 [employer]. [address] [place] [address], [place] [address]. All rights reserved. This information  is not intended as a substitute for professional medical care. Always follow your healthcare professional's instructions.      Heart Failure: Making Changes to Your Diet  You have a condition called heart failure. When you have heart failure, excess fluid is more likely to build up in your body because your heart isn't working well. This makes the heart work harder to pump blood. Fluid buildup causes symptoms such as shortness of breath and swelling (edema). This is often referred to as congestive heart failure or CHF. Controlling the amount of salt (sodium) you eat may help stop fluid from building up. Your doctor may also tell you to reduce the amount of fluid you drink.  Reading food labels    Your healthcare provider will tell you how much sodium you can eat each day. Read food labels to keep track. Keep in mind that certain foods are high in salt. These include canned, frozen, and processed foods. Check the amount of sodium in each serving. Watch out for high-sodium ingredients. These include MSG (monosodium glutamate), baking soda, and sodium phosphate.   Eating less salt  Give yourself time to get used to eating less salt. It may take a little while. Here are some tips to help:  · Take the saltshaker off the table. Replace it with salt-free herb mixes and spices.  · Eat fresh or plain frozen vegetables. These have much less salt than canned vegetables.  · Choose low-sodium snacks like sodium-free pretzels, crackers, or air-popped popcorn.  · Dont add salt to your food when youre cooking. Instead, season your foods with pepper, lemon, garlic, or onion.  · When you eat out, ask that your food be cooked without added salt.  · Avoid eating fried foods as these often have a great deal of salt.  If youre told to limit fluids  You may need to limit how much fluid you have to help prevent swelling. This includes anything that is liquid at room temperature, such as ice cream and soup. If your doctor tells you to  limit fluid, try these tips:  · Measure drinks in a measuring cup before you drink them. This will help you meet daily goals.  · Chill drinks to make them more refreshing.  · Suck on frozen lemon wedges to quench thirst.  · Only drink when youre thirsty.  · Chew sugarless gum or suck on hard candy to keep your mouth moist.  · Weigh yourself daily to know if your body's fluid content is rising.  My sodium goal  Your healthcare provider may give you a sodium goal to meet each day. This includes sodium found in food as well as salt that you add. My goal is to eat no more than ___________ mg of sodium per day.     When to call your doctor  Call your doctor right away if you have any symptoms of worsening heart failure. These can include:  · Sudden weight gain  · Increased swelling of your legs or ankles  · Trouble breathing when youre resting or at night  · Increase in the number of pillows you have to sleep on  · Chest pain, pressure, discomfort, or pain in the jaw, neck, or back   Date Last Reviewed: 3/21/2016  © 9824-4364 [employer]. [address] [place] [address], [place] [address]. All rights reserved. This information is not intended as a substitute for professional medical care. Always follow your healthcare professional's instructions.

## 2019-02-03 NOTE — NURSING
Discharge instructions and medlist given.  Patient verbalized understanding.  Denies need for escort or wheelchair off unit.

## 2019-02-03 NOTE — PLAN OF CARE
RANDYSW met with the patient at the bedside. Patient is homeless and does not have insurance. Patient is not currently being followed by a PCP. Patient has not plans to see a PCP or take any medications.     Patient denies the use of substances. Patient denies a history of mental health.     Patient will be discharged back to the community.      02/03/19 0952   Discharge Assessment   Assessment Type Discharge Planning Assessment   Confirmed/corrected address and phone number on facesheet? Yes   Assessment information obtained from? Patient   Communicated expected length of stay with patient/caregiver no   Prior to hospitilization cognitive status: Alert/Oriented   Prior to hospitalization functional status: Independent   Current cognitive status: Alert/Oriented   Current Functional Status: Independent   Lives With other (see comments)   Able to Return to Prior Arrangements yes   Is patient able to care for self after discharge? Yes   Patient's perception of discharge disposition home health   Readmission Within the Last 30 Days no previous admission in last 30 days   Patient currently being followed by outpatient case management? No   Patient currently receives any other outside agency services? No   Equipment Currently Used at Home none   Do you have any problems affording any of your prescribed medications? No   Is the patient taking medications as prescribed? yes   Does the patient have transportation home? Yes   Transportation Anticipated car, drives self   Does the patient receive services at the [facility]? No   Discharge Plan A Home;Other   Patient/Family in Agreement with Plan yes

## 2019-02-03 NOTE — NURSING
NP notified of patient's troponin results, patient denies chest pains.  No new orders rec'd at this time.

## 2019-02-18 ENCOUNTER — HISTORICAL (OUTPATIENT)
Dept: ADMINISTRATIVE | Facility: HOSPITAL | Age: 59
End: 2019-02-18

## 2020-02-06 ENCOUNTER — HOSPITAL ENCOUNTER (OUTPATIENT)
Facility: HOSPITAL | Age: 60
Discharge: HOME | End: 2020-02-08
Attending: EMERGENCY MEDICINE | Admitting: HOSPITALIST
Payer: MEDICAID

## 2020-02-06 DIAGNOSIS — R07.9 CHEST PAIN: ICD-10-CM

## 2020-02-06 DIAGNOSIS — I50.9 CONGESTIVE HEART FAILURE, UNSPECIFIED HF CHRONICITY, UNSPECIFIED HEART FAILURE TYPE: Primary | ICD-10-CM

## 2020-02-06 DIAGNOSIS — I50.9 CONGESTIVE HEART FAILURE: ICD-10-CM

## 2020-02-06 DIAGNOSIS — E87.6 HYPOKALEMIA: ICD-10-CM

## 2020-02-06 DIAGNOSIS — R06.02 SOB (SHORTNESS OF BREATH): ICD-10-CM

## 2020-02-06 DIAGNOSIS — I50.23 ACUTE ON CHRONIC SYSTOLIC (CONGESTIVE) HEART FAILURE: ICD-10-CM

## 2020-02-06 PROBLEM — F17.210 DEPENDENCE ON NICOTINE FROM CIGARETTES: Status: ACTIVE | Noted: 2020-02-06

## 2020-02-06 PROBLEM — Z91.148 NONCOMPLIANCE WITH MEDICATION REGIMEN: Status: ACTIVE | Noted: 2020-02-06

## 2020-02-06 PROBLEM — Z59.00 HOMELESSNESS: Status: ACTIVE | Noted: 2020-02-06

## 2020-02-06 LAB
ALBUMIN SERPL BCP-MCNC: 3.7 G/DL (ref 3.5–5.2)
ALP SERPL-CCNC: 74 U/L (ref 55–135)
ALT SERPL W/O P-5'-P-CCNC: 12 U/L (ref 10–44)
ANION GAP SERPL CALC-SCNC: 5 MMOL/L (ref 8–16)
AST SERPL-CCNC: 20 U/L (ref 10–40)
BASOPHILS # BLD AUTO: 0.03 K/UL (ref 0–0.2)
BASOPHILS NFR BLD: 0.5 % (ref 0–1.9)
BILIRUB SERPL-MCNC: 0.6 MG/DL (ref 0.1–1)
BNP SERPL-MCNC: 2755 PG/ML (ref 0–99)
BUN SERPL-MCNC: 11 MG/DL (ref 6–20)
CALCIUM SERPL-MCNC: 8.8 MG/DL (ref 8.7–10.5)
CHLORIDE SERPL-SCNC: 104 MMOL/L (ref 95–110)
CO2 SERPL-SCNC: 29 MMOL/L (ref 23–29)
CREAT SERPL-MCNC: 1.2 MG/DL (ref 0.5–1.4)
DIFFERENTIAL METHOD: ABNORMAL
EOSINOPHIL # BLD AUTO: 0.1 K/UL (ref 0–0.5)
EOSINOPHIL NFR BLD: 1.4 % (ref 0–8)
ERYTHROCYTE [DISTWIDTH] IN BLOOD BY AUTOMATED COUNT: 14.1 % (ref 11.5–14.5)
EST. GFR  (AFRICAN AMERICAN): >60 ML/MIN/1.73 M^2
EST. GFR  (NON AFRICAN AMERICAN): >60 ML/MIN/1.73 M^2
GLUCOSE SERPL-MCNC: 100 MG/DL (ref 70–110)
HCT VFR BLD AUTO: 37.2 % (ref 40–54)
HGB BLD-MCNC: 11.9 G/DL (ref 14–18)
IMM GRANULOCYTES # BLD AUTO: 0.01 K/UL (ref 0–0.04)
LYMPHOCYTES # BLD AUTO: 1.2 K/UL (ref 1–4.8)
LYMPHOCYTES NFR BLD: 19.6 % (ref 18–48)
MCH RBC QN AUTO: 29.7 PG (ref 27–31)
MCHC RBC AUTO-ENTMCNC: 32 G/DL (ref 32–36)
MCV RBC AUTO: 93 FL (ref 82–98)
MONOCYTES # BLD AUTO: 0.4 K/UL (ref 0.3–1)
MONOCYTES NFR BLD: 6.9 % (ref 4–15)
NEUTROPHILS # BLD AUTO: 4.2 K/UL (ref 1.8–7.7)
NEUTROPHILS NFR BLD: 71.4 % (ref 38–73)
NRBC BLD-RTO: 0 /100 WBC
PLATELET # BLD AUTO: 278 K/UL (ref 150–350)
PMV BLD AUTO: 9.9 FL (ref 9.2–12.9)
POTASSIUM SERPL-SCNC: 3.1 MMOL/L (ref 3.5–5.1)
PROT SERPL-MCNC: 6.7 G/DL (ref 6–8.4)
RBC # BLD AUTO: 4.01 M/UL (ref 4.6–6.2)
SODIUM SERPL-SCNC: 138 MMOL/L (ref 136–145)
TROPONIN I SERPL DL<=0.01 NG/ML-MCNC: 0.05 NG/ML (ref 0–0.03)
WBC # BLD AUTO: 5.91 K/UL (ref 3.9–12.7)

## 2020-02-06 PROCEDURE — 85025 COMPLETE CBC W/AUTO DIFF WBC: CPT

## 2020-02-06 PROCEDURE — G0378 HOSPITAL OBSERVATION PER HR: HCPCS

## 2020-02-06 PROCEDURE — 83735 ASSAY OF MAGNESIUM: CPT

## 2020-02-06 PROCEDURE — 63600175 PHARM REV CODE 636 W HCPCS: Performed by: EMERGENCY MEDICINE

## 2020-02-06 PROCEDURE — 25000242 PHARM REV CODE 250 ALT 637 W/ HCPCS: Performed by: EMERGENCY MEDICINE

## 2020-02-06 PROCEDURE — 63600175 PHARM REV CODE 636 W HCPCS: Performed by: NURSE PRACTITIONER

## 2020-02-06 PROCEDURE — 99291 CRITICAL CARE FIRST HOUR: CPT | Mod: 25

## 2020-02-06 PROCEDURE — 99292 CRITICAL CARE ADDL 30 MIN: CPT

## 2020-02-06 PROCEDURE — 94640 AIRWAY INHALATION TREATMENT: CPT

## 2020-02-06 PROCEDURE — 93005 ELECTROCARDIOGRAM TRACING: CPT

## 2020-02-06 PROCEDURE — 80053 COMPREHEN METABOLIC PANEL: CPT

## 2020-02-06 PROCEDURE — 84484 ASSAY OF TROPONIN QUANT: CPT

## 2020-02-06 PROCEDURE — 96372 THER/PROPH/DIAG INJ SC/IM: CPT | Mod: 59

## 2020-02-06 PROCEDURE — 36415 COLL VENOUS BLD VENIPUNCTURE: CPT

## 2020-02-06 PROCEDURE — 83036 HEMOGLOBIN GLYCOSYLATED A1C: CPT

## 2020-02-06 PROCEDURE — 96374 THER/PROPH/DIAG INJ IV PUSH: CPT

## 2020-02-06 PROCEDURE — 25000003 PHARM REV CODE 250: Performed by: NURSE PRACTITIONER

## 2020-02-06 PROCEDURE — 25000003 PHARM REV CODE 250: Performed by: EMERGENCY MEDICINE

## 2020-02-06 PROCEDURE — 80061 LIPID PANEL: CPT

## 2020-02-06 PROCEDURE — 96376 TX/PRO/DX INJ SAME DRUG ADON: CPT

## 2020-02-06 PROCEDURE — 83880 ASSAY OF NATRIURETIC PEPTIDE: CPT

## 2020-02-06 RX ORDER — ASPIRIN 81 MG/1
81 TABLET ORAL DAILY
Status: DISCONTINUED | OUTPATIENT
Start: 2020-02-07 | End: 2020-02-08 | Stop reason: HOSPADM

## 2020-02-06 RX ORDER — CARVEDILOL 3.12 MG/1
3.12 TABLET ORAL 2 TIMES DAILY WITH MEALS
Status: DISCONTINUED | OUTPATIENT
Start: 2020-02-07 | End: 2020-02-07

## 2020-02-06 RX ORDER — ONDANSETRON 4 MG/1
4 TABLET, ORALLY DISINTEGRATING ORAL EVERY 8 HOURS PRN
Status: DISCONTINUED | OUTPATIENT
Start: 2020-02-06 | End: 2020-02-08 | Stop reason: HOSPADM

## 2020-02-06 RX ORDER — IPRATROPIUM BROMIDE AND ALBUTEROL SULFATE 2.5; .5 MG/3ML; MG/3ML
3 SOLUTION RESPIRATORY (INHALATION) EVERY 20 MIN
Status: COMPLETED | OUTPATIENT
Start: 2020-02-06 | End: 2020-02-06

## 2020-02-06 RX ORDER — SODIUM CHLORIDE 0.9 % (FLUSH) 0.9 %
10 SYRINGE (ML) INJECTION
Status: DISCONTINUED | OUTPATIENT
Start: 2020-02-06 | End: 2020-02-08 | Stop reason: HOSPADM

## 2020-02-06 RX ORDER — HYDROCODONE BITARTRATE AND ACETAMINOPHEN 5; 325 MG/1; MG/1
1 TABLET ORAL ONCE
Status: COMPLETED | OUTPATIENT
Start: 2020-02-06 | End: 2020-02-06

## 2020-02-06 RX ORDER — FUROSEMIDE 10 MG/ML
60 INJECTION INTRAMUSCULAR; INTRAVENOUS
Status: COMPLETED | OUTPATIENT
Start: 2020-02-06 | End: 2020-02-06

## 2020-02-06 RX ORDER — FUROSEMIDE 10 MG/ML
20 INJECTION INTRAMUSCULAR; INTRAVENOUS ONCE
Status: COMPLETED | OUTPATIENT
Start: 2020-02-06 | End: 2020-02-06

## 2020-02-06 RX ORDER — METOPROLOL SUCCINATE 25 MG/1
25 TABLET, EXTENDED RELEASE ORAL DAILY
Status: DISCONTINUED | OUTPATIENT
Start: 2020-02-07 | End: 2020-02-06

## 2020-02-06 RX ORDER — ENOXAPARIN SODIUM 100 MG/ML
40 INJECTION SUBCUTANEOUS EVERY 24 HOURS
Status: DISCONTINUED | OUTPATIENT
Start: 2020-02-06 | End: 2020-02-08 | Stop reason: HOSPADM

## 2020-02-06 RX ORDER — FUROSEMIDE 20 MG/1
20 TABLET ORAL 2 TIMES DAILY
Status: DISCONTINUED | OUTPATIENT
Start: 2020-02-07 | End: 2020-02-08 | Stop reason: HOSPADM

## 2020-02-06 RX ORDER — ATORVASTATIN CALCIUM 40 MG/1
40 TABLET, FILM COATED ORAL DAILY
Status: DISCONTINUED | OUTPATIENT
Start: 2020-02-07 | End: 2020-02-08 | Stop reason: HOSPADM

## 2020-02-06 RX ORDER — LOSARTAN POTASSIUM 25 MG/1
25 TABLET ORAL DAILY
Status: DISCONTINUED | OUTPATIENT
Start: 2020-02-07 | End: 2020-02-06

## 2020-02-06 RX ORDER — POTASSIUM CHLORIDE 20 MEQ/1
40 TABLET, EXTENDED RELEASE ORAL
Status: COMPLETED | OUTPATIENT
Start: 2020-02-06 | End: 2020-02-06

## 2020-02-06 RX ADMIN — IPRATROPIUM BROMIDE AND ALBUTEROL SULFATE 3 ML: .5; 3 SOLUTION RESPIRATORY (INHALATION) at 06:02

## 2020-02-06 RX ADMIN — FUROSEMIDE 20 MG: 10 INJECTION, SOLUTION INTRAMUSCULAR; INTRAVENOUS at 11:02

## 2020-02-06 RX ADMIN — HYDROCODONE BITARTRATE AND ACETAMINOPHEN 1 TABLET: 5; 325 TABLET ORAL at 11:02

## 2020-02-06 RX ADMIN — POTASSIUM CHLORIDE 40 MEQ: 1500 TABLET, EXTENDED RELEASE ORAL at 07:02

## 2020-02-06 RX ADMIN — ENOXAPARIN SODIUM 40 MG: 100 INJECTION SUBCUTANEOUS at 11:02

## 2020-02-06 RX ADMIN — IPRATROPIUM BROMIDE AND ALBUTEROL SULFATE 3 ML: .5; 3 SOLUTION RESPIRATORY (INHALATION) at 07:02

## 2020-02-06 RX ADMIN — FUROSEMIDE 60 MG: 10 INJECTION, SOLUTION INTRAMUSCULAR; INTRAVENOUS at 07:02

## 2020-02-07 LAB
AMPHET+METHAMPHET UR QL: NEGATIVE
ANION GAP SERPL CALC-SCNC: 13 MMOL/L (ref 8–16)
AORTIC ROOT ANNULUS: 3 CM
AV INDEX (PROSTH): 0.91
AV MEAN GRADIENT: 1 MMHG
AV PEAK GRADIENT: 2 MMHG
AV VALVE AREA: 2.84 CM2
AV VELOCITY RATIO: 1
BARBITURATES UR QL SCN>200 NG/ML: NEGATIVE
BENZODIAZ UR QL SCN>200 NG/ML: NEGATIVE
BSA FOR ECHO PROCEDURE: 1.65 M2
BUN SERPL-MCNC: 9 MG/DL (ref 6–20)
BZE UR QL SCN: NEGATIVE
CALCIUM SERPL-MCNC: 8.9 MG/DL (ref 8.7–10.5)
CANNABINOIDS UR QL SCN: NEGATIVE
CHLORIDE SERPL-SCNC: 99 MMOL/L (ref 95–110)
CHOLEST SERPL-MCNC: 196 MG/DL (ref 120–199)
CHOLEST/HDLC SERPL: 5.6 {RATIO} (ref 2–5)
CO2 SERPL-SCNC: 29 MMOL/L (ref 23–29)
CREAT SERPL-MCNC: 1.2 MG/DL (ref 0.5–1.4)
CREAT UR-MCNC: 77.3 MG/DL (ref 23–375)
CV ECHO LV RWT: 0.2 CM
DOP CALC AO PEAK VEL: 0.7 M/S
DOP CALC AO VTI: 8.5 CM
DOP CALC LVOT AREA: 3.1 CM2
DOP CALC LVOT DIAMETER: 2 CM
DOP CALC LVOT PEAK VEL: 0.7 M/S
DOP CALC LVOT STROKE VOLUME: 24.18 CM3
DOP CALCLVOT PEAK VEL VTI: 7.7 CM
ECHO LV POSTERIOR WALL: 0.7 CM (ref 0.6–1.1)
EST. GFR  (AFRICAN AMERICAN): >60 ML/MIN/1.73 M^2
EST. GFR  (NON AFRICAN AMERICAN): >60 ML/MIN/1.73 M^2
ESTIMATED AVG GLUCOSE: 111 MG/DL (ref 68–131)
FRACTIONAL SHORTENING: 10 % (ref 28–44)
GLUCOSE SERPL-MCNC: 106 MG/DL (ref 70–110)
HBA1C MFR BLD HPLC: 5.5 % (ref 4–5.6)
HDLC SERPL-MCNC: 35 MG/DL (ref 40–75)
HDLC SERPL: 17.9 % (ref 20–50)
INTERVENTRICULAR SEPTUM: 0.8 CM (ref 0.6–1.1)
IVC PROX: 2 CM
LDLC SERPL CALC-MCNC: 136.8 MG/DL (ref 63–159)
LEFT INTERNAL DIMENSION IN SYSTOLE: 6.3 CM (ref 2.1–4)
LEFT VENTRICLE MASS INDEX: 137 G/M2
LEFT VENTRICULAR INTERNAL DIMENSION IN DIASTOLE: 7 CM (ref 3.5–6)
LEFT VENTRICULAR MASS: 226.18 G
MAGNESIUM SERPL-MCNC: 2.3 MG/DL (ref 1.6–2.6)
METHADONE UR QL SCN>300 NG/ML: NEGATIVE
MV A" WAVE DURATION": 118 MSEC
MV MEAN GRADIENT: 1 MMHG
MV STENOSIS PRESSURE HALF TIME: 21 MS
MV VALVE AREA P 1/2 METHOD: 10.48 CM2
NONHDLC SERPL-MCNC: 161 MG/DL
OPIATES UR QL SCN: NORMAL
PCP UR QL SCN>25 NG/ML: NEGATIVE
POTASSIUM SERPL-SCNC: 3.2 MMOL/L (ref 3.5–5.1)
PULM VEIN A" WAVE DURATION": 106 MSEC
RA PRESSURE: 3 MMHG
RIGHT VENTRICULAR END-DIASTOLIC DIMENSION: 4.6 CM
SODIUM SERPL-SCNC: 141 MMOL/L (ref 136–145)
STJ: 2.8 CM
TDI LATERAL: 0.03 M/S
TDI SEPTAL: 0.04 M/S
TDI: 0.04 M/S
TOXICOLOGY INFORMATION: NORMAL
TRICUSPID ANNULAR PLANE SYSTOLIC EXCURSION: 1.5 CM
TRIGL SERPL-MCNC: 121 MG/DL (ref 30–150)
TROPONIN I SERPL DL<=0.01 NG/ML-MCNC: 0.05 NG/ML (ref 0–0.03)

## 2020-02-07 PROCEDURE — 94729 PR C02/MEMBANE DIFFUSE CAPACITY: ICD-10-PCS | Mod: 26,,, | Performed by: INTERNAL MEDICINE

## 2020-02-07 PROCEDURE — 94060 PR EVAL OF BRONCHOSPASM: ICD-10-PCS | Mod: 26,,, | Performed by: INTERNAL MEDICINE

## 2020-02-07 PROCEDURE — G0378 HOSPITAL OBSERVATION PER HR: HCPCS

## 2020-02-07 PROCEDURE — 80048 BASIC METABOLIC PNL TOTAL CA: CPT

## 2020-02-07 PROCEDURE — 84484 ASSAY OF TROPONIN QUANT: CPT

## 2020-02-07 PROCEDURE — 63600175 PHARM REV CODE 636 W HCPCS: Performed by: INTERNAL MEDICINE

## 2020-02-07 PROCEDURE — 36415 COLL VENOUS BLD VENIPUNCTURE: CPT

## 2020-02-07 PROCEDURE — 80307 DRUG TEST PRSMV CHEM ANLYZR: CPT

## 2020-02-07 PROCEDURE — 94060 EVALUATION OF WHEEZING: CPT | Mod: 26,,, | Performed by: INTERNAL MEDICINE

## 2020-02-07 PROCEDURE — 25000003 PHARM REV CODE 250: Performed by: INTERNAL MEDICINE

## 2020-02-07 PROCEDURE — 94727 PR PULM FUNCTION TEST BY GAS: ICD-10-PCS | Mod: 26,,, | Performed by: INTERNAL MEDICINE

## 2020-02-07 PROCEDURE — 94060 EVALUATION OF WHEEZING: CPT

## 2020-02-07 PROCEDURE — 63600175 PHARM REV CODE 636 W HCPCS: Performed by: NURSE PRACTITIONER

## 2020-02-07 PROCEDURE — 96372 THER/PROPH/DIAG INJ SC/IM: CPT

## 2020-02-07 PROCEDURE — 96376 TX/PRO/DX INJ SAME DRUG ADON: CPT

## 2020-02-07 PROCEDURE — 94729 DIFFUSING CAPACITY: CPT

## 2020-02-07 PROCEDURE — 94727 GAS DIL/WSHOT DETER LNG VOL: CPT

## 2020-02-07 PROCEDURE — 94727 GAS DIL/WSHOT DETER LNG VOL: CPT | Mod: 26,,, | Performed by: INTERNAL MEDICINE

## 2020-02-07 PROCEDURE — 25000003 PHARM REV CODE 250: Performed by: NURSE PRACTITIONER

## 2020-02-07 PROCEDURE — 94761 N-INVAS EAR/PLS OXIMETRY MLT: CPT

## 2020-02-07 PROCEDURE — 97802 MEDICAL NUTRITION INDIV IN: CPT

## 2020-02-07 PROCEDURE — 94729 DIFFUSING CAPACITY: CPT | Mod: 26,,, | Performed by: INTERNAL MEDICINE

## 2020-02-07 RX ORDER — FUROSEMIDE 10 MG/ML
20 INJECTION INTRAMUSCULAR; INTRAVENOUS ONCE
Status: COMPLETED | OUTPATIENT
Start: 2020-02-07 | End: 2020-02-07

## 2020-02-07 RX ORDER — LOSARTAN POTASSIUM 25 MG/1
50 TABLET ORAL DAILY
Status: DISCONTINUED | OUTPATIENT
Start: 2020-02-08 | End: 2020-02-08 | Stop reason: HOSPADM

## 2020-02-07 RX ORDER — SPIRONOLACTONE 25 MG/1
25 TABLET ORAL DAILY
Status: DISCONTINUED | OUTPATIENT
Start: 2020-02-07 | End: 2020-02-08 | Stop reason: HOSPADM

## 2020-02-07 RX ADMIN — ENOXAPARIN SODIUM 40 MG: 100 INJECTION SUBCUTANEOUS at 04:02

## 2020-02-07 RX ADMIN — ATORVASTATIN CALCIUM 40 MG: 40 TABLET, FILM COATED ORAL at 09:02

## 2020-02-07 RX ADMIN — FUROSEMIDE 20 MG: 20 TABLET ORAL at 05:02

## 2020-02-07 RX ADMIN — FUROSEMIDE 20 MG: 20 TABLET ORAL at 09:02

## 2020-02-07 RX ADMIN — SPIRONOLACTONE 25 MG: 25 TABLET ORAL at 03:02

## 2020-02-07 RX ADMIN — ASPIRIN 81 MG: 81 TABLET, COATED ORAL at 09:02

## 2020-02-07 RX ADMIN — FUROSEMIDE 20 MG: 10 INJECTION, SOLUTION INTRAMUSCULAR; INTRAVENOUS at 03:02

## 2020-02-07 NOTE — ASSESSMENT & PLAN NOTE
Chronic problem  Stress the importance of follow-up to  Stressed the importance of medication regimen

## 2020-02-07 NOTE — CONSULTS
Subjective:       Mayank Medellin is a 60 y.o. male with h/o HFrEF, HTN, non compliance, was admitted for sob. He ran out of Lasix and Aldactone several days ago.  Shortness of breath is worse with minimal exertion, with orthopnea. Pt denies chest pain, palpitation, dizziness, syncope or presyncope, leg swelling, PND, fever or chill, coughing, abd pain, n/v, or active bleeding. Received iv lasix since admission, reports feeling a little bit better.  Chronic cig smoker, 1PPD x30y.    Past Medical History:   Diagnosis Date    CHF (congestive heart failure)     Hypertension      Family History   Family history unknown: Yes     No current facility-administered medications on file prior to encounter.      Current Outpatient Medications on File Prior to Encounter   Medication Sig Dispense Refill    aspirin (ECOTRIN) 81 MG EC tablet Take 81 mg by mouth once daily.      atorvastatin (LIPITOR) 40 MG tablet Take 40 mg by mouth once daily.      carvedilol (COREG) 3.125 MG tablet Take 1 tablet (3.125 mg total) by mouth 2 (two) times daily with meals. 60 tablet 3    furosemide (LASIX) 20 MG tablet Take 20 mg by mouth 2 (two) times daily.      losartan (COZAAR) 25 MG tablet Take 0.5 tablets (12.5 mg total) by mouth once daily. 45 tablet 3    spironolactone (ALDACTONE) 25 MG tablet Take 25 mg by mouth once daily.       Review of Systems  12 systems reviewed, negative except mentioned in HPI.     Objective:     Vital Signs (Most Recent):  Temp: 96.6 °F (35.9 °C) (02/07/20 0729)  Pulse: 90 (02/07/20 0729)  Resp: 18 (02/07/20 0729)  BP: 129/85 (02/07/20 0729)  SpO2: 97 % (02/07/20 0745) Vital Signs (24h Range):  Temp:  [96.6 °F (35.9 °C)-98.1 °F (36.7 °C)] 96.6 °F (35.9 °C)  Pulse:  [86-91] 90  Resp:  [18-20] 18  SpO2:  [93 %-100 %] 97 %  BP: (129-146)/(84-94) 129/85       Physical Exam  Gen: Calm, lying on bed, in no distress  Skin: warm and dry  Lymph: no lymphadenopathy detected  HEENT: NC/AT  Neck: supple, no bruit; JVD  +  Chest: no deformity, equal movement b/l  Lung: CTA b/l  Heart: RRR, S1/S2 +, no M/G/R  Abd: BS+, S, NT/ND  Ext: no deformity, no pretibial edema  Pulse: b/l radial 2+  Neuro: AAOx3    Cardiographics  ECG 2/6/20: Normal sinus rhythm, 87 bpm, Possible Left atrial enlargement, Nonspecific intraventricular block, cannot rule out Anterior infarct (cited on or before 02-FEB-2019), T wave abnormality, consider inferolateral ischemia.  Echocardiogram 2/7/20:  · Eccentric left ventricular hypertrophy. Global hypokinesis with severe left ventricular enlargement.Severely decreased left ventricular systolic function. The estimated ejection fraction is 15%. Possible grade 2 diastolic dysfunction.  · Moderate right ventricular enlargement. Mildly reduced right ventricular systolic function.  · Moderate left atrial enlargement.  · Mild right atrial enlargement.  · Mild mitral regurgitation.  · Normal central venous pressure (3 mmHg).    Imaging  Chest x-ray 2/6/20: Findings consistent with mild CHF    Lab Review   Lab Results   Component Value Date    WBC 5.91 02/06/2020    HGB 11.9 (L) 02/06/2020    HCT 37.2 (L) 02/06/2020    MCV 93 02/06/2020     02/06/2020     BMP  Lab Results   Component Value Date     02/07/2020    K 3.2 (L) 02/07/2020    CL 99 02/07/2020    CO2 29 02/07/2020    BUN 9 02/07/2020    CREATININE 1.2 02/07/2020    CALCIUM 8.9 02/07/2020    ANIONGAP 13 02/07/2020    ESTGFRAFRICA >60 02/07/2020    EGFRNONAA >60 02/07/2020   Results for ARLYN CLEMENTE (MRN [id number]) as of 2/7/2020 09:42   Ref. Range 2/2/2019 05:10 2/2/2019 16:22 2/2/2019 20:15 2/6/2020 18:36   BNP Latest Ref Range: 0 - 99 pg/mL 1,916 (H)   2,755 (H)   Troponin I Latest Ref Range: 0- 0.026 ng/mL 0.046 (H) 0.175 (H) 0.139 (H) 0.046 (H)       Assessment:   HFrEF EF 15%.  Slightly elevated Trop, likely due to HF.  COPD?  HTN  cig smoker: recommend d/c  Non compliance     Plan:   Drug screen; Trop.  PFTs  Low salt diet and fluid  restriction.  Continue ASA, lipitor  D/c Coreg 3.125mg bid; increase  losartan 50 mg/d.  Lasix 20mg iv x1, continue lasix 20mg po bid; add spironolactone 25mg/d.

## 2020-02-07 NOTE — HPI
Mayank Medellin is a 60-year-old male who presented to the hospital complaining of shortness of breath. He has a previous history of systolic heart failure, hypertension, and noncompliance.  He states he ran out of his Lasix and Aldactone several days ago.  Shortness of breath is worse with minimal exertion.  Symptoms are minimally improved with rest.  He denies chest pain, fever, chills, nausea or vomiting. He is currently homeless.  Emergency room workup demonstrated mild hypokalemia which was replaced.  BNP was greater than 2000.  He was given 60 mg of Lasix in ER.  Chest x-ray with findings of mild CHF.

## 2020-02-07 NOTE — ASSESSMENT & PLAN NOTE
Acute problem  Patient has hypokalemia which is currently controlled. Last electrolytes reviewed-   Recent Labs   Lab 02/06/20  1836   K 3.1*   . Will replace potassium and monitor electrolytes closely.

## 2020-02-07 NOTE — RESPIRATORY THERAPY
02/07/20 0745   PRE-TX-O2   O2 Device (Oxygen Therapy) room air   SpO2 97 %   Pulse Oximetry Type Intermittent   $ Pulse Oximetry - Multiple Charge Pulse Oximetry - Multiple

## 2020-02-07 NOTE — ASSESSMENT & PLAN NOTE
Chronic problem   Patient is chronically on statin.will continue for now. Monitor clinically. Last LDL was   Lab Results   Component Value Date    LDLCALC 128.2 02/03/2019

## 2020-02-07 NOTE — RESPIRATORY THERAPY
02/07/20 0730   Tobacco Cessation Intervention   Do you use any type of tobacco product? Yes   Are you interested in quitting use of tobacco products? Not interested   Pt is current daily smoker.  Pt educated on smoking cessation. Pt states he is not interested in quitting.

## 2020-02-07 NOTE — ASSESSMENT & PLAN NOTE
Chronic problem  Chronic, controlled.  Latest blood pressure and vitals reviewed-   Temp:  [97.4 °F (36.3 °C)]   Pulse:  [86-91]   Resp:  [18-20]   BP: (139-146)/(84-94)   SpO2:  [94 %-100 %] .   Home meds for hypertension were reviewed and noted below. Hospital anti-hypertensive changes were made as shown below.  Hypertension Medications             carvedilol (COREG) 3.125 MG tablet Take 1 tablet (3.125 mg total) by mouth 2 (two) times daily with meals.    furosemide (LASIX) 20 MG tablet Take 20 mg by mouth 2 (two) times daily.    losartan (COZAAR) 25 MG tablet Take 0.5 tablets (12.5 mg total) by mouth once daily.    spironolactone (ALDACTONE) 25 MG tablet Take 25 mg by mouth once daily.      Hospital Medications             furosemide injection 20 mg 20 mg, Intravenous, Once    furosemide injection 60 mg (Completed) 60 mg, Intravenous, ED 1 Time    losartan tablet 25 mg Starting on 2/7/2020. 25 mg, Oral, Daily    metoprolol succinate (TOPROL-XL) 24 hr tablet 25 mg Starting on 2/7/2020. 25 mg, Oral, Daily, DO NOT CRUSH OR CHEW; SWALLOW WHOLE.        Will utilize p.r.n. blood pressure medication only if patient's blood pressure greater than  180/110 and he develops symptoms such as worsening chest pain or shortness of breath.

## 2020-02-07 NOTE — ED PROVIDER NOTES
"Encounter Date: 2/6/2020    SCRIBE #1 NOTE: I, Carolina Elder, am scribing for, and in the presence of, Kofi Guerrero MD.       History     Chief Complaint   Patient presents with    Shortness of Breath     today with heart "fluttering"       Time seen by provider: 6:21 PM on 02/06/2020    Mayank Medellin is a 60 y.o. male with PMHx of HTN and CHF who presents to the ED for evaluation of SOB and heart fluttering. SOB started a few days ago. He reports associated chest pain only when coughing. He denies is productive and he denies chest pain at rest. Heart fluttering started this morning and lasted less than 30 minutes. He is a current smoker, 1 ppd. Patient denies fever, chills, sweating, leg swelling, and other new symptoms. Patient has no other medical concerns or complaints at this moment. He was seen in the ED x1 year ago and diagnosed with CHF. No SHx. Penicillin allergy noted.     The history is provided by the patient.     Review of patient's allergies indicates:   Allergen Reactions    Penicillins      hives     Past Medical History:   Diagnosis Date    CHF (congestive heart failure)     Hypertension      History reviewed. No pertinent surgical history.  History reviewed. No pertinent family history.  Social History     Tobacco Use    Smoking status: Current Every Day Smoker     Packs/day: 1.50     Years: 45.00     Pack years: 67.50    Smokeless tobacco: Never Used   Substance Use Topics    Alcohol use: No     Frequency: Never    Drug use: No     Review of Systems   Constitutional: Negative for fever.   Eyes: Negative for visual disturbance.   Respiratory: Positive for cough and shortness of breath.    Cardiovascular: Positive for palpitations. Negative for leg swelling.   Gastrointestinal: Negative for abdominal pain, nausea and vomiting.   Musculoskeletal: Negative for back pain, gait problem and joint swelling.   Skin: Negative for rash.   Neurological: Negative for weakness and numbness. "   Hematological: Does not bruise/bleed easily.   Psychiatric/Behavioral: The patient is not nervous/anxious.        Physical Exam     Initial Vitals [02/06/20 1755]   BP Pulse Resp Temp SpO2   (!) 146/84 91 20 97.4 °F (36.3 °C) (!) 94 %      MAP       --         Physical Exam    Nursing note and vitals reviewed.  Constitutional: He appears well-developed and well-nourished.   HENT:   Head: Normocephalic and atraumatic.   Eyes: Conjunctivae are normal.   Neck: Normal range of motion. Neck supple.   Cardiovascular: Normal rate and regular rhythm. Exam reveals gallop and S3. Exam reveals no friction rub.    No murmur heard.  Pulses:       Radial pulses are 2+ on the right side, and 2+ on the left side.   Regular rate and rhythm.  No murmur or rub.   + S3 gallop   Pulmonary/Chest: No respiratory distress. He has no wheezes. He has no rhonchi. He has rales.   Bibasilar rales.   Abdominal: Soft. He exhibits no distension. There is hepatomegaly. There is no tenderness.   Musculoskeletal: Normal range of motion. He exhibits no edema.   No pre tibular edema.   Neurological: He is alert and oriented to person, place, and time.   Skin: Skin is warm and dry.   Psychiatric: He has a normal mood and affect.         ED Course   Critical Care  Date/Time: 2/6/2020 10:00 PM  Performed by: Kofi Guerrero III, MD  Authorized by: Yolanda Holder MD   Direct patient critical care time: 75 minutes  Total critical care time (exclusive of procedural time) : 75 minutes  Critical care was necessary to treat or prevent imminent or life-threatening deterioration of the following conditions: cardiac failure.  Critical care was time spent personally by me on the following activities: review of old charts, pulse oximetry, ordering and review of laboratory studies, obtaining history from patient or surrogate, evaluation of patient's response to treatment, development of treatment plan with patient or surrogate, discussions with primary provider,  examination of patient, ordering and performing treatments and interventions, ordering and review of radiographic studies and re-evaluation of patient's condition.  Subsequent provider of critical care: I assumed direction of critical care for this patient from another provider of my specialty.        Labs Reviewed   B-TYPE NATRIURETIC PEPTIDE - Abnormal; Notable for the following components:       Result Value    BNP 2,755 (*)     All other components within normal limits   CBC W/ AUTO DIFFERENTIAL - Abnormal; Notable for the following components:    RBC 4.01 (*)     Hemoglobin 11.9 (*)     Hematocrit 37.2 (*)     All other components within normal limits   COMPREHENSIVE METABOLIC PANEL - Abnormal; Notable for the following components:    Potassium 3.1 (*)     Anion Gap 5 (*)     All other components within normal limits   TROPONIN I - Abnormal; Notable for the following components:    Troponin I 0.046 (*)     All other components within normal limits     EKG Readings: (Independently Interpreted)   Initial Reading: No STEMI. Rhythm: Normal Sinus Rhythm. Heart Rate: 87 BPM. T Waves Flipped: I, AVL and V5.   Nonspecific incomplete intraventricular block.  Lateral T-wave inversion.       Imaging Results          X-Ray Chest PA And Lateral (Final result)  Result time 02/06/20 18:52:58    Final result by Hermelinda Todd MD (02/06/20 18:52:58)                 Impression:      Findings consistent with mild CHF      Electronically signed by: Hermelinda Todd MD  Date:    02/06/2020  Time:    18:52             Narrative:    EXAMINATION:  XR CHEST PA AND LATERAL    CLINICAL HISTORY:  Shortness of breath    TECHNIQUE:  PA and lateral views of the chest were performed.    COMPARISON:  02/02/2019    FINDINGS:  The heart appears borderline mildly enlarged there are mild interstitial infiltrates perihilar and trace thickening of or fluid along the fissures and blunting of the costophrenic sulci.  Appearance consistent with CHF.   Findings similar but slightly more prominent today than on the prior exam                                 Medical Decision Making:   History:   Old Medical Records: I decided to obtain old medical records.  Clinical Tests:   Lab Tests: Reviewed and Ordered  Radiological Study: Reviewed and Ordered  Medical Tests: Reviewed and Ordered  ED Management:  60-year-old male presents with progressively worsening shortness of breath.  He is found to have congestive heart failure.  He also has a mildly elevated troponin with lateral T-wave inversion which is unchanged from previous.  He will be admitted for diuresis and serial troponins.  Other:   I have discussed this case with another health care provider.       APC / Resident Notes:   I, Dr. Kofi Guerrero III, personally performed the services described in this documentation. All medical record entries made by the scribe were at my direction and in my presence.  I have reviewed the chart and agree that the record reflects my personal performance and is accurate and complete       Scribe Attestation:   Scribe #1: I performed the above scribed service and the documentation accurately describes the services I performed. I attest to the accuracy of the note.                  Clinical Impression:       ICD-10-CM ICD-9-CM   1. Congestive heart failure, unspecified HF chronicity, unspecified heart failure type I50.9 428.0   2. SOB (shortness of breath) R06.02 786.05   3. Hypokalemia E87.6 276.8   4. Congestive heart failure I50.9 428.0                             Kofi Guerrero III, MD  02/06/20 4947

## 2020-02-07 NOTE — PLAN OF CARE
Pt currently having echo done at bedside. CM will try again at later time to complete discharge assessment.        02/07/20 6915   Discharge Assessment   Assessment Type Discharge Planning Assessment

## 2020-02-07 NOTE — H&P
"[facility] Medicine  History & Physical    Patient Name: Mayank Medellin  MRN: [id number]  Admission Date: 2/6/2020  Attending Physician: Yolanda Holder MD   Primary Care Provider: Primary Doctor No         Patient information was obtained from patient, past medical records and ER records.     Subjective:     Principal Problem:Acute on chronic systolic (congestive) heart failure    Chief Complaint:   Chief Complaint   Patient presents with    Shortness of Breath     today with heart "fluttering"        HPI: Mayank Medellin is a 60-year-old male who presented to the hospital complaining of shortness of breath. He has a previous history of systolic heart failure, hypertension, and noncompliance.  He states he ran out of his Lasix and Aldactone several days ago.  Shortness of breath is worse with minimal exertion.  Symptoms are minimally improved with rest.  He denies chest pain, fever, chills, nausea or vomiting. He is currently homeless.  Emergency room workup demonstrated mild hypokalemia which was replaced.  BNP was greater than 2000.  He was given 60 mg of Lasix in ER.  Chest x-ray with findings of mild CHF.    Past Medical History:   Diagnosis Date    CHF (congestive heart failure)     Hypertension        History reviewed. No pertinent surgical history.    Review of patient's allergies indicates:   Allergen Reactions    Penicillins      hives       No current facility-administered medications on file prior to encounter.      Current Outpatient Medications on File Prior to Encounter   Medication Sig    aspirin (ECOTRIN) 81 MG EC tablet Take 81 mg by mouth once daily.    atorvastatin (LIPITOR) 40 MG tablet Take 40 mg by mouth once daily.    carvedilol (COREG) 3.125 MG tablet Take 1 tablet (3.125 mg total) by mouth 2 (two) times daily with meals.    furosemide (LASIX) 20 MG tablet Take 20 mg by mouth 2 (two) times daily.    losartan (COZAAR) 25 MG tablet Take 0.5 tablets (12.5 mg " total) by mouth once daily.    spironolactone (ALDACTONE) 25 MG tablet Take 25 mg by mouth once daily.     Family History     Family history is unknown by patient.        Tobacco Use    Smoking status: Current Every Day Smoker     Packs/day: 1.50     Years: 45.00     Pack years: 67.50    Smokeless tobacco: Never Used   Substance and Sexual Activity    Alcohol use: No     Frequency: Never    Drug use: No    Sexual activity: Not Currently     Review of Systems   Constitutional: Positive for activity change and fatigue. Negative for chills, diaphoresis and fever.   HENT: Negative for congestion, nosebleeds and tinnitus.    Eyes: Negative for photophobia and visual disturbance.   Respiratory: Positive for cough and shortness of breath. Negative for chest tightness and wheezing.    Cardiovascular: Negative for chest pain, palpitations and leg swelling.   Gastrointestinal: Negative for abdominal distention, abdominal pain, constipation, diarrhea, nausea and vomiting.   Endocrine: Negative for cold intolerance and heat intolerance.   Genitourinary: Negative for difficulty urinating, dysuria, frequency, hematuria and urgency.   Musculoskeletal: Negative for arthralgias, back pain and myalgias.   Skin: Negative for pallor, rash and wound.   Allergic/Immunologic: Negative for immunocompromised state.   Neurological: Negative for dizziness, tremors, facial asymmetry, speech difficulty and weakness.   Hematological: Negative for adenopathy. Does not bruise/bleed easily.   Psychiatric/Behavioral: Negative for confusion and sleep disturbance. The patient is not nervous/anxious.      Objective:     Vital Signs (Most Recent):  Temp: 97.4 °F (36.3 °C) (02/06/20 1755)  Pulse: 88 (02/06/20 1908)  Resp: 19 (02/06/20 1908)  BP: (!) 139/94 (02/06/20 1829)  SpO2: 100 % (02/06/20 1908) Vital Signs (24h Range):  Temp:  [97.4 °F (36.3 °C)] 97.4 °F (36.3 °C)  Pulse:  [86-91] 88  Resp:  [18-20] 19  SpO2:  [94 %-100 %] 100 %  BP:  (139-146)/(84-94) 139/94     Weight: 59.8 kg (131 lb 13.4 oz)  Body mass index is 21.94 kg/m².    Physical Exam   Constitutional: He is oriented to person, place, and time. He appears well-developed and well-nourished. No distress.   HENT:   Head: Normocephalic.   Mouth/Throat: Oropharynx is clear and moist.   Eyes: Pupils are equal, round, and reactive to light. Conjunctivae are normal. No scleral icterus.   Neck: Normal range of motion. Neck supple. No JVD present.   Cardiovascular: Normal rate, regular rhythm, normal heart sounds and intact distal pulses. Exam reveals no gallop and no friction rub.   No murmur heard.  Pulmonary/Chest: Effort normal. No respiratory distress. He has no wheezes. He has rales.   Mild diffuse crackles   Abdominal: Soft. Bowel sounds are normal. He exhibits no distension. There is no tenderness. There is no rebound and no guarding.   Musculoskeletal: Normal range of motion. He exhibits no edema or tenderness.   Lymphadenopathy:     He has no cervical adenopathy.   Neurological: He is alert and oriented to person, place, and time. He displays normal reflexes. No cranial nerve deficit or sensory deficit. Coordination normal.   Skin: Skin is warm and dry. Capillary refill takes less than 2 seconds. No rash noted. He is not diaphoretic. No erythema. No pallor.   Psychiatric: He has a normal mood and affect. His behavior is normal. Judgment and thought content normal.   Nursing note and vitals reviewed.        CRANIAL NERVES     CN III, IV, VI   Pupils are equal, round, and reactive to light.       Significant Labs:   CBC:   Recent Labs   Lab 02/06/20  1836   WBC 5.91   HGB 11.9*   HCT 37.2*        CMP:   Recent Labs   Lab 02/06/20  1836      K 3.1*      CO2 29      BUN 11   CREATININE 1.2   CALCIUM 8.8   PROT 6.7   ALBUMIN 3.7   BILITOT 0.6   ALKPHOS 74   AST 20   ALT 12   ANIONGAP 5*   EGFRNONAA >60     Cardiac Markers:   Recent Labs   Lab 02/06/20  1836   BNP  2,755*     Troponin:   Recent Labs   Lab 02/06/20  1836   TROPONINI 0.046*       Significant Imaging: I have reviewed all pertinent imaging results/findings within the past 24 hours.    Assessment/Plan:     * Acute on chronic systolic (congestive) heart failure  Acute on chronic problem  Patient is identified as having Systolic heart failure that is Acute on Chronic. CHF is currently uncontrolled due to Pulmonary edema. Latest ECHO shows ejection fraction of 15-20% . Continue BB and monitor clinical status closely. Monitor on telemetry. Patient is on CHF pathway.  Monitor strict Is&Os and daily weights.  Place on fluid restriction of 1500ml. Continue to stress to patient importance of self efficacy and  on diet for CHF. Last BNP reviewed- and noted below   Recent Labs   Lab 02/06/20  1836   BNP 2,755*   .          Homelessness  Chronic problem  Consult       Noncompliance with medication regimen  Chronic problem  Stress the importance of follow-up to  Stressed the importance of medication regimen      Hypokalemia  Acute problem  Patient has hypokalemia which is currently controlled. Last electrolytes reviewed-   Recent Labs   Lab 02/06/20  1836   K 3.1*   . Will replace potassium and monitor electrolytes closely.       Dependence on nicotine from cigarettes  Chronic problem  Dangers of cigarette smoking were reviewed with patient in detail for 10 minutes and patient was encouraged to quit. Nicotine replacement options were discussed.      Mixed hyperlipidemia  Chronic problem   Patient is chronically on statin.will continue for now. Monitor clinically. Last LDL was   Lab Results   Component Value Date    LDLCALC 128.2 02/03/2019          Essential hypertension  Chronic problem  Chronic, controlled.  Latest blood pressure and vitals reviewed-   Temp:  [97.4 °F (36.3 °C)]   Pulse:  [86-91]   Resp:  [18-20]   BP: (139-146)/(84-94)   SpO2:  [94 %-100 %] .   Home meds for hypertension were reviewed  and noted below. Hospital anti-hypertensive changes were made as shown below.  Hypertension Medications             carvedilol (COREG) 3.125 MG tablet Take 1 tablet (3.125 mg total) by mouth 2 (two) times daily with meals.    furosemide (LASIX) 20 MG tablet Take 20 mg by mouth 2 (two) times daily.    losartan (COZAAR) 25 MG tablet Take 0.5 tablets (12.5 mg total) by mouth once daily.    spironolactone (ALDACTONE) 25 MG tablet Take 25 mg by mouth once daily.      Hospital Medications             furosemide injection 20 mg 20 mg, Intravenous, Once    furosemide injection 60 mg (Completed) 60 mg, Intravenous, ED 1 Time    losartan tablet 25 mg Starting on 2/7/2020. 25 mg, Oral, Daily    metoprolol succinate (TOPROL-XL) 24 hr tablet 25 mg Starting on 2/7/2020. 25 mg, Oral, Daily, DO NOT CRUSH OR CHEW; SWALLOW WHOLE.        Will utilize p.r.n. blood pressure medication only if patient's blood pressure greater than  180/110 and he develops symptoms such as worsening chest pain or shortness of breath.        VTE Risk Mitigation (From admission, onward)         Ordered     enoxaparin injection 40 mg  Daily      02/06/20 2128     IP VTE HIGH RISK PATIENT  Once      02/06/20 2128     Place LEXI davidsone  Until discontinued      02/06/20 2128                   Deshaun Edwards NP  Department of Hospital Medicine   [facility]

## 2020-02-07 NOTE — PLAN OF CARE
VSS. NAD. Pt educated on importance of taking medication regimen. No complaints at this time. Monitored on telemetry. Strict I & Os. POC reviewed. Safety maintained. Bed low and locked, call light in reach. Will continue to monitor.

## 2020-02-07 NOTE — ASSESSMENT & PLAN NOTE
Acute on chronic problem  Patient is identified as having Systolic heart failure that is Acute on Chronic. CHF is currently uncontrolled due to Pulmonary edema. Latest ECHO shows ejection fraction of 15-20% . Continue BB and monitor clinical status closely. Monitor on telemetry. Patient is on CHF pathway.  Monitor strict Is&Os and daily weights.  Place on fluid restriction of 1500ml. Continue to stress to patient importance of self efficacy and  on diet for CHF. Last BNP reviewed- and noted below   Recent Labs   Lab 02/06/20  1836   BNP 2,755*   .

## 2020-02-07 NOTE — ASSESSMENT & PLAN NOTE
Chronic problem  Dangers of cigarette smoking were reviewed with patient in detail for 10 minutes and patient was encouraged to quit. Nicotine replacement options were discussed.

## 2020-02-07 NOTE — SUBJECTIVE & OBJECTIVE
Past Medical History:   Diagnosis Date    CHF (congestive heart failure)     Hypertension        History reviewed. No pertinent surgical history.    Review of patient's allergies indicates:   Allergen Reactions    Penicillins      hives       No current facility-administered medications on file prior to encounter.      Current Outpatient Medications on File Prior to Encounter   Medication Sig    aspirin (ECOTRIN) 81 MG EC tablet Take 81 mg by mouth once daily.    atorvastatin (LIPITOR) 40 MG tablet Take 40 mg by mouth once daily.    carvedilol (COREG) 3.125 MG tablet Take 1 tablet (3.125 mg total) by mouth 2 (two) times daily with meals.    furosemide (LASIX) 20 MG tablet Take 20 mg by mouth 2 (two) times daily.    losartan (COZAAR) 25 MG tablet Take 0.5 tablets (12.5 mg total) by mouth once daily.    spironolactone (ALDACTONE) 25 MG tablet Take 25 mg by mouth once daily.     Family History     Family history is unknown by patient.        Tobacco Use    Smoking status: Current Every Day Smoker     Packs/day: 1.50     Years: 45.00     Pack years: 67.50    Smokeless tobacco: Never Used   Substance and Sexual Activity    Alcohol use: No     Frequency: Never    Drug use: No    Sexual activity: Not Currently     Review of Systems   Constitutional: Positive for activity change and fatigue. Negative for chills, diaphoresis and fever.   HENT: Negative for congestion, nosebleeds and tinnitus.    Eyes: Negative for photophobia and visual disturbance.   Respiratory: Positive for cough and shortness of breath. Negative for chest tightness and wheezing.    Cardiovascular: Negative for chest pain, palpitations and leg swelling.   Gastrointestinal: Negative for abdominal distention, abdominal pain, constipation, diarrhea, nausea and vomiting.   Endocrine: Negative for cold intolerance and heat intolerance.   Genitourinary: Negative for difficulty urinating, dysuria, frequency, hematuria and urgency.    Musculoskeletal: Negative for arthralgias, back pain and myalgias.   Skin: Negative for pallor, rash and wound.   Allergic/Immunologic: Negative for immunocompromised state.   Neurological: Negative for dizziness, tremors, facial asymmetry, speech difficulty and weakness.   Hematological: Negative for adenopathy. Does not bruise/bleed easily.   Psychiatric/Behavioral: Negative for confusion and sleep disturbance. The patient is not nervous/anxious.      Objective:     Vital Signs (Most Recent):  Temp: 97.4 °F (36.3 °C) (02/06/20 1755)  Pulse: 88 (02/06/20 1908)  Resp: 19 (02/06/20 1908)  BP: (!) 139/94 (02/06/20 1829)  SpO2: 100 % (02/06/20 1908) Vital Signs (24h Range):  Temp:  [97.4 °F (36.3 °C)] 97.4 °F (36.3 °C)  Pulse:  [86-91] 88  Resp:  [18-20] 19  SpO2:  [94 %-100 %] 100 %  BP: (139-146)/(84-94) 139/94     Weight: 59.8 kg (131 lb 13.4 oz)  Body mass index is 21.94 kg/m².    Physical Exam   Constitutional: He is oriented to person, place, and time. He appears well-developed and well-nourished. No distress.   HENT:   Head: Normocephalic.   Mouth/Throat: Oropharynx is clear and moist.   Eyes: Pupils are equal, round, and reactive to light. Conjunctivae are normal. No scleral icterus.   Neck: Normal range of motion. Neck supple. No JVD present.   Cardiovascular: Normal rate, regular rhythm, normal heart sounds and intact distal pulses. Exam reveals no gallop and no friction rub.   No murmur heard.  Pulmonary/Chest: Effort normal. No respiratory distress. He has no wheezes. He has rales.   Mild diffuse crackles   Abdominal: Soft. Bowel sounds are normal. He exhibits no distension. There is no tenderness. There is no rebound and no guarding.   Musculoskeletal: Normal range of motion. He exhibits no edema or tenderness.   Lymphadenopathy:     He has no cervical adenopathy.   Neurological: He is alert and oriented to person, place, and time. He displays normal reflexes. No cranial nerve deficit or sensory deficit.  Coordination normal.   Skin: Skin is warm and dry. Capillary refill takes less than 2 seconds. No rash noted. He is not diaphoretic. No erythema. No pallor.   Psychiatric: He has a normal mood and affect. His behavior is normal. Judgment and thought content normal.   Nursing note and vitals reviewed.        CRANIAL NERVES     CN III, IV, VI   Pupils are equal, round, and reactive to light.       Significant Labs:   CBC:   Recent Labs   Lab 02/06/20  1836   WBC 5.91   HGB 11.9*   HCT 37.2*        CMP:   Recent Labs   Lab 02/06/20  1836      K 3.1*      CO2 29      BUN 11   CREATININE 1.2   CALCIUM 8.8   PROT 6.7   ALBUMIN 3.7   BILITOT 0.6   ALKPHOS 74   AST 20   ALT 12   ANIONGAP 5*   EGFRNONAA >60     Cardiac Markers:   Recent Labs   Lab 02/06/20  1836   BNP 2,755*     Troponin:   Recent Labs   Lab 02/06/20  1836   TROPONINI 0.046*       Significant Imaging: I have reviewed all pertinent imaging results/findings within the past 24 hours.

## 2020-02-07 NOTE — CONSULTS
"Food & Nutrition  Education    Diet Education: For education on fluid and salt restriction   Time Spent: 15 minutes  Learners: Patient       Nutrition Education provided with handouts: Fluid Restricted Nutrition Therapy, Heart Healthy Low Sodium Nutrition Therapy, Heart Healthy Label Reading Tips      Comments: Pt was alert and agreed to receive diet education. Pt reports receiving diet education in the past. Discussed what to look for when reading nutrition labels. Spoke about ways to lower sodium intake when preparing meals. Spoke about what is considered a fluid and how to restrict fluids and combat the feeling of thirst. Pt stated "I can't eat anything," after discussing fluid restriction. Reassured pt that there is a vast variety of foods he can eat, and that it was small changes over time to reach the overall goal. Pt was non-receptive to teaching, often not listening or avoiding eye contact.    Pt reports eating 75% of his meals, that his appetite is slightly decrease, and he is not overly fond of the food. Pt reports small amounts of occasional diarrhea in the past week. No N/V reported.     NFPE not completed today 2/7/20: pt not willing to cooperate. Per chart review pt has healthy BMI and no recent wt loss. Weighed 130 lb on 12/10/19.       All questions and concerns answered. Dietitian's contact information provided.       Follow-Up 1x/weekly    Please Re-consult as needed        Thanks!      "

## 2020-02-07 NOTE — PLAN OF CARE
Patient alert and oriented , takes pills whole, co of cramping leg pain, PRN dose pain meds given, tele on and functioning, iv intact, Patient reminded to wear slipper socks when up and walking and  use call light before transfers, continent of bowel and bladder , safety and neuro intact   Detail Level: Zone

## 2020-02-07 NOTE — ED NOTES
Patient identifiers for Mayank Medellin checked and correct.  LOC: Patient is awake, alert, and aware of environment with an appropriate affect. Patient is oriented x 3 and speaking appropriately.  APPEARANCE: Patient resting comfortably and in no acute distress. Patient is NOT clean and well groomed, patient's clothing is Dirty but properly fastened. Pt is homeless dressed in snow suit with sweat shirt and coat  SKIN: The skin is warm and dry. Patient has normal skin turgor and moist mucus membrances. Skin is intact; no bruising or breakdown noted.  MUSKULOSKELETAL: Patient is moving all extremities well, no obvious deformities noted. Pulses intact.   RESPIRATORY: Airway is open and patent. Respirations are spontaneous and non-labored with normal effort and rate. Rales in bases c/o shortness of breath x1 week   CARDIAC: Patient has a normal rate and rhythm. No peripheral edema noted. Capillary refill < 3 seconds. S3 gallop, c/o palpitations    ABDOMEN: No distention noted. Bowel sounds active in all 4 quadrants. Soft and non-tender upon palpation. hepatomegaly   NEUROLOGICAL: PERRL. Facial expression is symmetrical. Hand grasps are equal bilaterally. Normal sensation in all extremities when touched with finger.  Allergies reported:   Review of patient's allergies indicates:   Allergen Reactions    Penicillins      hives

## 2020-02-07 NOTE — PLAN OF CARE
CM met with pt bedside to complete discharge assessment.  Pt verified that he is homeless. Denies having POA/LW. Denies having PCP- stated he was ok with CM setting up follow up appt at [facility]. Denies having any support system from friends or family. States he has a brother that lives in [place]. Stated he does not know his contact information because they do not communicate and haven't for years. Pt denies any DME. Reports being independent with ADLs. States he uses the city bus or walks for his transportation. CM offered list of local homeless shelter information - pt at first declined and then stated I can leave information at his bedside and he will look at it if he wants. CM following to assist in any DC needs.       02/07/20 1400   Discharge Assessment   Assessment Type Discharge Planning Assessment   Confirmed/corrected address and phone number on facesheet? Yes   Assessment information obtained from? Patient   Communicated expected length of stay with patient/caregiver yes   Prior to hospitilization cognitive status: Alert/Oriented   Prior to hospitalization functional status: Independent   Current cognitive status: Alert/Oriented   Current Functional Status: Independent   Lives With other (see comments)  (homeless)   Able to Return to Prior Arrangements yes   Is patient able to care for self after discharge? Yes   Patient's perception of discharge disposition homeless   Readmission Within the Last 30 Days no previous admission in last 30 days   Patient currently being followed by outpatient case management? No   Patient currently receives any other outside agency services? No   Equipment Currently Used at Home none   Do you have any problems affording any of your prescribed medications? No   Is the patient taking medications as prescribed? no   Does the patient have transportation home? No   Does the patient receive services at the [facility]? No   Discharge Plan A Other   DME Needed Upon  Pt sleeping at this time tolerated full liquid diet without pain or nausea   Discharge  none   Patient/Family in Agreement with Plan yes

## 2020-02-08 ENCOUNTER — OUTSIDE PLACE OF SERVICE (OUTPATIENT)
Dept: PULMONOLOGY | Facility: CLINIC | Age: 60
End: 2020-02-08
Payer: MEDICAID

## 2020-02-08 VITALS
OXYGEN SATURATION: 96 % | RESPIRATION RATE: 18 BRPM | DIASTOLIC BLOOD PRESSURE: 88 MMHG | TEMPERATURE: 96 F | BODY MASS INDEX: 21.41 KG/M2 | SYSTOLIC BLOOD PRESSURE: 117 MMHG | HEART RATE: 93 BPM | HEIGHT: 65 IN | WEIGHT: 128.5 LBS

## 2020-02-08 DIAGNOSIS — J44.9 CHRONIC OBSTRUCTIVE PULMONARY DISEASE (COPD): ICD-10-CM

## 2020-02-08 LAB
ANION GAP SERPL CALC-SCNC: 7 MMOL/L (ref 8–16)
BUN SERPL-MCNC: 13 MG/DL (ref 6–20)
CALCIUM SERPL-MCNC: 8.9 MG/DL (ref 8.7–10.5)
CHLORIDE SERPL-SCNC: 99 MMOL/L (ref 95–110)
CO2 SERPL-SCNC: 30 MMOL/L (ref 23–29)
CREAT SERPL-MCNC: 1 MG/DL (ref 0.5–1.4)
EST. GFR  (AFRICAN AMERICAN): >60 ML/MIN/1.73 M^2
EST. GFR  (NON AFRICAN AMERICAN): >60 ML/MIN/1.73 M^2
GLUCOSE SERPL-MCNC: 102 MG/DL (ref 70–110)
POTASSIUM SERPL-SCNC: 4 MMOL/L (ref 3.5–5.1)
SODIUM SERPL-SCNC: 136 MMOL/L (ref 136–145)
TROPONIN I SERPL DL<=0.01 NG/ML-MCNC: 0.04 NG/ML (ref 0–0.03)
TROPONIN I SERPL DL<=0.01 NG/ML-MCNC: 0.06 NG/ML (ref 0–0.03)

## 2020-02-08 PROCEDURE — 94640 AIRWAY INHALATION TREATMENT: CPT

## 2020-02-08 PROCEDURE — 99204 PR OFFICE/OUTPT VISIT, NEW, LEVL IV, 45-59 MIN: ICD-10-PCS | Mod: S$GLB,,, | Performed by: INTERNAL MEDICINE

## 2020-02-08 PROCEDURE — 84484 ASSAY OF TROPONIN QUANT: CPT | Mod: 91

## 2020-02-08 PROCEDURE — 99204 OFFICE O/P NEW MOD 45 MIN: CPT | Mod: S$GLB,,, | Performed by: INTERNAL MEDICINE

## 2020-02-08 PROCEDURE — G0378 HOSPITAL OBSERVATION PER HR: HCPCS

## 2020-02-08 PROCEDURE — 25000003 PHARM REV CODE 250: Performed by: HOSPITALIST

## 2020-02-08 PROCEDURE — 84484 ASSAY OF TROPONIN QUANT: CPT

## 2020-02-08 PROCEDURE — 25000242 PHARM REV CODE 250 ALT 637 W/ HCPCS: Performed by: HOSPITALIST

## 2020-02-08 PROCEDURE — 36415 COLL VENOUS BLD VENIPUNCTURE: CPT

## 2020-02-08 PROCEDURE — 94761 N-INVAS EAR/PLS OXIMETRY MLT: CPT

## 2020-02-08 PROCEDURE — 80048 BASIC METABOLIC PNL TOTAL CA: CPT

## 2020-02-08 PROCEDURE — 93005 ELECTROCARDIOGRAM TRACING: CPT

## 2020-02-08 PROCEDURE — 25000003 PHARM REV CODE 250: Performed by: NURSE PRACTITIONER

## 2020-02-08 PROCEDURE — 25000003 PHARM REV CODE 250: Performed by: INTERNAL MEDICINE

## 2020-02-08 RX ORDER — LOSARTAN POTASSIUM 50 MG/1
50 TABLET ORAL DAILY
Qty: 90 TABLET | Refills: 0 | Status: SHIPPED | OUTPATIENT
Start: 2020-02-08 | End: 2021-02-07

## 2020-02-08 RX ORDER — ALBUTEROL SULFATE 2.5 MG/.5ML
2.5 SOLUTION RESPIRATORY (INHALATION) EVERY 4 HOURS PRN
Status: DISCONTINUED | OUTPATIENT
Start: 2020-02-08 | End: 2020-02-08 | Stop reason: HOSPADM

## 2020-02-08 RX ORDER — IPRATROPIUM BROMIDE AND ALBUTEROL SULFATE 2.5; .5 MG/3ML; MG/3ML
3 SOLUTION RESPIRATORY (INHALATION) EVERY 6 HOURS
Status: DISCONTINUED | OUTPATIENT
Start: 2020-02-08 | End: 2020-02-08 | Stop reason: HOSPADM

## 2020-02-08 RX ORDER — GUAIFENESIN 600 MG/1
600 TABLET, EXTENDED RELEASE ORAL 2 TIMES DAILY
Status: DISCONTINUED | OUTPATIENT
Start: 2020-02-08 | End: 2020-02-08 | Stop reason: HOSPADM

## 2020-02-08 RX ADMIN — FUROSEMIDE 20 MG: 20 TABLET ORAL at 08:02

## 2020-02-08 RX ADMIN — ATORVASTATIN CALCIUM 40 MG: 40 TABLET, FILM COATED ORAL at 08:02

## 2020-02-08 RX ADMIN — ASPIRIN 81 MG: 81 TABLET, COATED ORAL at 08:02

## 2020-02-08 RX ADMIN — IPRATROPIUM BROMIDE AND ALBUTEROL SULFATE 3 ML: .5; 2.5 SOLUTION RESPIRATORY (INHALATION) at 01:02

## 2020-02-08 RX ADMIN — GUAIFENESIN 600 MG: 600 TABLET, EXTENDED RELEASE ORAL at 11:02

## 2020-02-08 RX ADMIN — SPIRONOLACTONE 25 MG: 25 TABLET ORAL at 08:02

## 2020-02-08 NOTE — SUBJECTIVE & OBJECTIVE
Interval History:  Reports shortness of breath is improved.  Reports his chest feels dry.  Cardiologist recommending pulmonary consult today. Suspecting COPD  Review of Systems   Constitutional: Positive for chills. Negative for fever.   Respiratory: Positive for cough and shortness of breath.    Cardiovascular: Positive for leg swelling. Negative for chest pain.        Dry chest    Gastrointestinal: Negative for abdominal pain and nausea.   Genitourinary: Negative for difficulty urinating and dysuria.   Psychiatric/Behavioral: Negative for agitation and confusion.     Objective:     Vital Signs (Most Recent):  Temp: 96.4 °F (35.8 °C) (02/08/20 1146)  Pulse: 91 (02/08/20 1146)  Resp: 18 (02/08/20 1146)  BP: 117/88 (02/08/20 1146)  SpO2: 95 % (02/08/20 1146) Vital Signs (24h Range):  Temp:  [96.4 °F (35.8 °C)-97.7 °F (36.5 °C)] 96.4 °F (35.8 °C)  Pulse:  [82-96] 91  Resp:  [17-18] 18  SpO2:  [95 %-99 %] 95 %  BP: (117-138)/() 117/88     Weight: 58.3 kg (128 lb 8.5 oz)  Body mass index is 21.39 kg/m².    Intake/Output Summary (Last 24 hours) at 2/8/2020 1257  Last data filed at 2/8/2020 0330  Gross per 24 hour   Intake 240 ml   Output 2200 ml   Net -1960 ml      Physical Exam   Constitutional: He is oriented to person, place, and time. He appears well-developed and well-nourished. No distress.   HENT:   Head: Normocephalic.   Mouth/Throat: Oropharynx is clear and moist.   Eyes: Pupils are equal, round, and reactive to light. Conjunctivae are normal. No scleral icterus.   Neck: Normal range of motion. Neck supple. No JVD present.   Cardiovascular: Normal rate, regular rhythm, normal heart sounds and intact distal pulses. Exam reveals no gallop and no friction rub.   No murmur heard.  Pulmonary/Chest: Effort normal. No respiratory distress. He has no wheezes. He has rales.   Mild diffuse crackles   Abdominal: Soft. Bowel sounds are normal. He exhibits no distension. There is no tenderness. There is no rebound  and no guarding.   Musculoskeletal: Normal range of motion. He exhibits no edema or tenderness.   Lymphadenopathy:     He has no cervical adenopathy.   Neurological: He is alert and oriented to person, place, and time. He displays normal reflexes. No cranial nerve deficit or sensory deficit. Coordination normal.   Skin: Skin is warm and dry. Capillary refill takes less than 2 seconds. No rash noted. He is not diaphoretic. No erythema. No pallor.   Psychiatric: He has a normal mood and affect. His behavior is normal. Judgment and thought content normal.   Nursing note and vitals reviewed.      Significant Labs: All pertinent labs within the past 24 hours have been reviewed.    Significant Imaging: I have reviewed and interpreted all pertinent imaging results/findings within the past 24 hours.

## 2020-02-08 NOTE — PROGRESS NOTES
[facility] Hospital Medicine  Progress Note    Patient Name: Mayank Medellin  MRN: [id number]  Patient Class: OP- Observation   Admission Date: 2/6/2020  Length of Stay: 0 days  Attending Physician: Yolanda Holder MD  Primary Care Provider: Primary Doctor No        Subjective:     Principal Problem:Acute on chronic systolic (congestive) heart failure        HPI:  Mayank Medellin is a 60-year-old male who presented to the hospital complaining of shortness of breath. He has a previous history of systolic heart failure, hypertension, and noncompliance.  He states he ran out of his Lasix and Aldactone several days ago.  Shortness of breath is worse with minimal exertion.  Symptoms are minimally improved with rest.  He denies chest pain, fever, chills, nausea or vomiting. He is currently homeless.  Emergency room workup demonstrated mild hypokalemia which was replaced.  BNP was greater than 2000.  He was given 60 mg of Lasix in ER.  Chest x-ray with findings of mild CHF.    Overview/Hospital Course:  60-year-old homeless man around of medications admitted for acute systolic heart failure and blood pressure control.    Interval History:   Feels less short of breath since admission.    Review of Systems   Constitutional: Positive for chills. Negative for fever.   Respiratory: Positive for cough and shortness of breath.    Cardiovascular: Positive for chest pain and leg swelling.   Gastrointestinal: Negative for abdominal pain and nausea.     Objective:     Vital Signs (Most Recent):  Temp: 96.6 °F (35.9 °C) (02/07/20 1749)  Pulse: 87 (02/07/20 1749)  Resp: 18 (02/07/20 1749)  BP: 133/79 (02/07/20 1749)  SpO2: 96 % (02/07/20 1749) Vital Signs (24h Range):  Temp:  [96.2 °F (35.7 °C)-98.1 °F (36.7 °C)] 96.6 °F (35.9 °C)  Pulse:  [86-94] 87  Resp:  [18-19] 18  SpO2:  [93 %-100 %] 96 %  BP: (129-145)/(79-98) 133/79     Weight: 59.4 kg (131 lb)  Body mass index is 21.8 kg/m².    Intake/Output Summary (Last 24  hours) at 2/7/2020 1826  Last data filed at 2/7/2020 1738  Gross per 24 hour   Intake 1080 ml   Output 1950 ml   Net -870 ml      Physical Exam   Constitutional: He is oriented to person, place, and time. He appears well-developed and well-nourished. No distress.   HENT:   Head: Normocephalic.   Mouth/Throat: Oropharynx is clear and moist.   Eyes: Pupils are equal, round, and reactive to light. Conjunctivae are normal. No scleral icterus.   Neck: Normal range of motion. Neck supple. No JVD present.   Cardiovascular: Normal rate, regular rhythm, normal heart sounds and intact distal pulses. Exam reveals no gallop and no friction rub.   No murmur heard.  Pulmonary/Chest: Effort normal. No respiratory distress. He has no wheezes. He has rales.   Mild diffuse crackles   Abdominal: Soft. Bowel sounds are normal. He exhibits no distension. There is no tenderness. There is no rebound and no guarding.   Musculoskeletal: Normal range of motion. He exhibits no edema or tenderness.   Lymphadenopathy:     He has no cervical adenopathy.   Neurological: He is alert and oriented to person, place, and time. He displays normal reflexes. No cranial nerve deficit or sensory deficit. Coordination normal.   Skin: Skin is warm and dry. Capillary refill takes less than 2 seconds. No rash noted. He is not diaphoretic. No erythema. No pallor.   Psychiatric: He has a normal mood and affect. His behavior is normal. Judgment and thought content normal.   Nursing note and vitals reviewed.      Significant Labs:   BMP:   Recent Labs   Lab 02/06/20 1828 02/07/20  0522   GLU  --    < > 106   NA  --    < > 141   K  --    < > 3.2*   CL  --    < > 99   CO2  --    < > 29   BUN  --    < > 9   CREATININE  --    < > 1.2   CALCIUM  --    < > 8.9   MG 2.3  --   --     < > = values in this interval not displayed.     CBC:   Recent Labs   Lab 02/06/20 1836   WBC 5.91   HGB 11.9*   HCT 37.2*        Cardiac Markers:   Recent Labs   Lab 02/06/20 1836    BNP 2,755*     Lactic Acid: No results for input(s): LACTATE in the last 48 hours.  Lipid Panel:   Recent Labs   Lab 02/06/20  1828   CHOL 196   HDL 35*   LDLCALC 136.8   TRIG 121   CHOLHDL 17.9*     Troponin:   Recent Labs   Lab 02/06/20 1836 02/07/20  0522   TROPONINI 0.046* 0.050*     Urine Studies: No results for input(s): COLORU, APPEARANCEUA, PHUR, SPECGRAV, PROTEINUA, GLUCUA, KETONESU, BILIRUBINUA, OCCULTUA, NITRITE, UROBILINOGEN, LEUKOCYTESUR, RBCUA, WBCUA, BACTERIA, SQUAMEPITHEL, HYALINECASTS in the last 48 hours.    Invalid input(s): WRIGHTSUR    Significant Imaging: I have reviewed and interpreted all pertinent imaging results/findings within the past 24 hours.      Assessment/Plan:      * Acute on chronic systolic (congestive) heart failure  Acute on chronic problem-due to noncompliance and running out of medications.  Patient is identified as having Systolic heart failure that is Acute on Chronic. CHF is currently uncontrolled due to Pulmonary edema. Latest ECHO shows ejection fraction of 15-20% . Continue BB and monitor clinical status closely. Monitor on telemetry. Patient is on CHF pathway.  Monitor strict Is&Os and daily weights.  Place on fluid restriction of 1500ml. Continue to stress to patient importance of self efficacy and  on diet for CHF. Last BNP reviewed- and noted below   Recent Labs   Lab 02/06/20 1836   BNP 2,755*   .          Homelessness  Chronic problem  Consult       Noncompliance with medication regimen  Chronic problem  Stress the importance of follow-up to  Stressed the importance of medication regimen      Hypokalemia  Acute problem  Patient has hypokalemia which is currently controlled. Last electrolytes reviewed-   Recent Labs   Lab 02/06/20 1836   K 3.1*   . Will replace potassium and monitor electrolytes closely.       Dependence on nicotine from cigarettes  Chronic problem  Dangers of cigarette smoking were reviewed with patient in detail for 10  minutes and patient was encouraged to quit. Nicotine replacement options were discussed.      Mixed hyperlipidemia  Chronic problem   Patient is chronically on statin.will continue for now. Monitor clinically. Last LDL was   Lab Results   Component Value Date    LDLCALC 128.2 02/03/2019          Essential hypertension  Chronic problem  Chronic, controlled.  Latest blood pressure and vitals reviewed-   Temp:  [96.2 °F (35.7 °C)-98.1 °F (36.7 °C)]   Pulse:  [86-94]   Resp:  [18-19]   BP: (129-145)/(79-98)   SpO2:  [93 %-100 %] .   Home meds for hypertension were reviewed and noted below. Hospital anti-hypertensive changes were made as shown below.  Hypertension Medications             carvedilol (COREG) 3.125 MG tablet Take 1 tablet (3.125 mg total) by mouth 2 (two) times daily with meals.    furosemide (LASIX) 20 MG tablet Take 20 mg by mouth 2 (two) times daily.    losartan (COZAAR) 25 MG tablet Take 0.5 tablets (12.5 mg total) by mouth once daily.    spironolactone (ALDACTONE) 25 MG tablet Take 25 mg by mouth once daily.      Hospital Medications             furosemide injection 20 mg 20 mg, Intravenous, Once    furosemide injection 60 mg (Completed) 60 mg, Intravenous, ED 1 Time    losartan tablet 25 mg Starting on 2/7/2020. 25 mg, Oral, Daily    metoprolol succinate (TOPROL-XL) 24 hr tablet 25 mg Starting on 2/7/2020. 25 mg, Oral, Daily, DO NOT CRUSH OR CHEW; SWALLOW WHOLE.        Will utilize p.r.n. blood pressure medication only if patient's blood pressure greater than  180/110 and he develops symptoms such as worsening chest pain or shortness of breath.        VTE Risk Mitigation (From admission, onward)         Ordered     enoxaparin injection 40 mg  Daily      02/06/20 2128     IP VTE HIGH RISK PATIENT  Once      02/06/20 2128     Place LEXI hose  Until discontinued      02/06/20 2128                      Yolanda Holder MD  Department of Hospital Medicine   [facility]

## 2020-02-08 NOTE — ASSESSMENT & PLAN NOTE
Acute problem  Patient has hypokalemia which is currently controlled. Last electrolytes reviewed-   Recent Labs   Lab 02/06/20  1836 02/07/20  0522 02/08/20  0513   K 3.1* 3.2* 4.0   . Will replace potassium and monitor electrolytes closely.

## 2020-02-08 NOTE — RESPIRATORY THERAPY
02/07/20 1945   Patient Assessment/Suction   Level of Consciousness (AVPU) alert   Respiratory Effort Unlabored   PRE-TX-O2   O2 Device (Oxygen Therapy) room air   SpO2 98 %   Pulse Oximetry Type Intermittent   $ Pulse Oximetry - Multiple Charge Pulse Oximetry - Multiple

## 2020-02-08 NOTE — NURSING
"Pt tried leaving hospital AMA. He insisted we said he was discharged. His discharge orders were placed pending cardio clearance. I explained to him that the cardiologist needed to see him since he is having current chest pain and SOB. He refused to sign AMA papers saying "I can't see that tiny print, I'm not signing that." Dr. Alvarez assisted with explaining and talked to him in his room. He has now agreed to stay and back on the monitor. Pt requested mucinex for his "dry chest." Notified Dr. Siddiqui of above. Ordered mucinex for pt.   "

## 2020-02-08 NOTE — PLAN OF CARE
02/08/20 1549   Final Note   Assessment Type Final Discharge Note   Anticipated Discharge Disposition Home

## 2020-02-08 NOTE — PROGRESS NOTES
[facility]  Cardiology  Progress Note    Patient Name: Mayank Medellin  MRN: [id number]  Admission Date: 2/6/2020  Hospital Length of Stay: 0 days  Code Status: Full Code   Attending Physician: Machelle Siddiqui MD   Primary Care Physician: Primary Doctor No  Expected Discharge Date: 2/8/2020  Principal Problem:Acute on chronic systolic (congestive) heart failure    Subjective:     Hospital Course: 60 y.o. male with h/o HFrEF, HTN, non compliance, was admitted for sob. He ran out of Lasix and Aldactone several days ago.  Shortness of breath is worse with minimal exertion, with orthopnea. Chronic cig smoker, 1PPD x30y.    Interval History: UOP about 2.7L in 24h. No significant improvement of sob. Walking in hallway w/o significant symptom. Complained of mild cp acrose inferior rib cage, with tenderness on palpation. ECG and Trop w/o significant change. PFTs showed likely severe COPD.    Review of Systems Negative except mentioned in HPI.    Objective:     Vital Signs (Most Recent):  Temp: 96.4 °F (35.8 °C) (02/08/20 1146)  Pulse: 91 (02/08/20 1146)  Resp: 18 (02/08/20 1146)  BP: 117/88 (02/08/20 1146)  SpO2: 95 % (02/08/20 1146) Vital Signs (24h Range):  Temp:  [96.4 °F (35.8 °C)-97.7 °F (36.5 °C)] 96.4 °F (35.8 °C)  Pulse:  [82-96] 91  Resp:  [17-18] 18  SpO2:  [95 %-99 %] 95 %  BP: (117-138)/() 117/88   Weight: 58.3 kg (128 lb 8.5 oz)  Body mass index is 21.39 kg/m².  SpO2: 95 %  O2 Device (Oxygen Therapy): room air    Intake/Output Summary (Last 24 hours) at 2/8/2020 1346  Last data filed at 2/8/2020 0330  Gross per 24 hour   Intake 240 ml   Output 1800 ml   Net -1560 ml     Lines/Drains/Airways     Peripheral Intravenous Line                 Peripheral IV - Single Lumen 02/06/20 1833 20 G Left Upper Arm 1 day               Physical Exam  Gen: Calm, sitting on bed, in no distress; thin.  Skin: warm and dry  Lymph: no lymphadenopathy detected  HEENT: NC/AT  Neck: supple, no bruit; JVD +  Chest:  no deformity, equal movement b/l; tenderness on palpation on inferior front chest wall  Lung: CTA b/l  Heart: RRR, S1/S2 +, no M/G/R  Abd: BS+, S, NT/ND  Ext: no deformity, no pretibial edema  Pulse: b/l radial 2+  Neuro: AAOx3     Cardiographics  ECG 2/6/20: Normal sinus rhythm, 87 bpm, Possible Left atrial enlargement, Nonspecific intraventricular block, cannot rule out Anterior infarct (cited on or before 02-FEB-2019), T wave abnormality, consider inferolateral ischemia.  Echocardiogram 2/7/20:  · Eccentric left ventricular hypertrophy. Global hypokinesis with severe left ventricular enlargement.Severely decreased left ventricular systolic function. The estimated ejection fraction is 15%. Possible grade 2 diastolic dysfunction.  · Moderate right ventricular enlargement. Mildly reduced right ventricular systolic function.  · Moderate left atrial enlargement.  · Mild right atrial enlargement.  · Mild mitral regurgitation.  · Normal central venous pressure (3 mmHg).     Imaging  Chest x-ray 2/6/20: Findings consistent with mild CHF    Lab Results   Component Value Date    WBC 5.91 02/06/2020    HGB 11.9 (L) 02/06/2020    HCT 37.2 (L) 02/06/2020    MCV 93 02/06/2020     02/06/2020     BMP  Lab Results   Component Value Date     02/08/2020    K 4.0 02/08/2020    CL 99 02/08/2020    CO2 30 (H) 02/08/2020    BUN 13 02/08/2020    CREATININE 1.0 02/08/2020    CALCIUM 8.9 02/08/2020    ANIONGAP 7 (L) 02/08/2020    ESTGFRAFRICA >60 02/08/2020    EGFRNONAA >60 02/08/2020   Results for ARLYN CLEMENTE (MRN [id number]) as of 2/8/2020 13:42   Ref. Range 2/6/2020 18:36 2/7/2020 05:22 2/8/2020 09:19   BNP Latest Ref Range: 0 - 99 pg/mL 2,755 (H)     Troponin I Latest Ref Range: 0.000 - 0.026 ng/mL 0.046 (H) 0.050 (H) 0.056 (H)       Assessment:   HFrEF EF 15%.  Slightly elevated Trop, likely due to HF.  COPD, FEV1 47, FEV1/FVC 56, TLC N2 116, RV N2 206, DLCO 41  HTN  cig smoker: recommend d/c  Non compliance      Plan:    Low salt diet and fluid restriction.  Continue ASA, lipitor  Continue  losartan 50 mg/d.  Continue lasix 20mg po bid; spironolactone 25mg/d.  Add Duoneb; consult pulm routine.    Amos Alvarez MD  Cardiology  [facility]

## 2020-02-08 NOTE — NURSING
Pt complaining of chest pain. Notified Dr. Siddiqui. Ordered to order STAT EKG and troponin and notify Dr. Alvarez.     Notified Dr. Alvarez. No new orders.

## 2020-02-08 NOTE — RESPIRATORY THERAPY
02/08/20 0925   PRE-TX-O2   O2 Device (Oxygen Therapy) room air   SpO2 97 %   Pulse Oximetry Type Intermittent   $ Pulse Oximetry - Multiple Charge Pulse Oximetry - Multiple   Pulse 90   Resp 18

## 2020-02-08 NOTE — ASSESSMENT & PLAN NOTE
Chronic problem  Chronic, controlled.  Latest blood pressure and vitals reviewed-   Temp:  [96.4 °F (35.8 °C)-97.7 °F (36.5 °C)]   Pulse:  [82-96]   Resp:  [17-18]   BP: (117-138)/()   SpO2:  [95 %-99 %] .   Home meds for hypertension were reviewed and noted below. Hospital anti-hypertensive changes were made as shown below.  Hypertension Medications             carvedilol (COREG) 3.125 MG tablet Take 1 tablet (3.125 mg total) by mouth 2 (two) times daily with meals.    furosemide (LASIX) 20 MG tablet Take 20 mg by mouth 2 (two) times daily.    losartan (COZAAR) 25 MG tablet Take 0.5 tablets (12.5 mg total) by mouth once daily.    spironolactone (ALDACTONE) 25 MG tablet Take 25 mg by mouth once daily.      Hospital Medications             furosemide injection 20 mg 20 mg, Intravenous, Once    furosemide injection 60 mg (Completed) 60 mg, Intravenous, ED 1 Time    losartan tablet 25 mg Starting on 2/7/2020. 25 mg, Oral, Daily    metoprolol succinate (TOPROL-XL) 24 hr tablet 25 mg Starting on 2/7/2020. 25 mg, Oral, Daily, DO NOT CRUSH OR CHEW; SWALLOW WHOLE.        Will utilize p.r.n. blood pressure medication only if patient's blood pressure greater than  180/110 and he develops symptoms such as worsening chest pain or shortness of breath.

## 2020-02-08 NOTE — ASSESSMENT & PLAN NOTE
Chronic problem   Patient is chronically on statin.will continue for now. Monitor clinically. Last LDL was   Lab Results   Component Value Date    LDLCALC 136.8 02/06/2020

## 2020-02-08 NOTE — SUBJECTIVE & OBJECTIVE
Interval History:   Feels less short of breath since admission.    Review of Systems   Constitutional: Positive for chills. Negative for fever.   Respiratory: Positive for cough and shortness of breath.    Cardiovascular: Positive for chest pain and leg swelling.   Gastrointestinal: Negative for abdominal pain and nausea.     Objective:     Vital Signs (Most Recent):  Temp: 96.6 °F (35.9 °C) (02/07/20 1749)  Pulse: 87 (02/07/20 1749)  Resp: 18 (02/07/20 1749)  BP: 133/79 (02/07/20 1749)  SpO2: 96 % (02/07/20 1749) Vital Signs (24h Range):  Temp:  [96.2 °F (35.7 °C)-98.1 °F (36.7 °C)] 96.6 °F (35.9 °C)  Pulse:  [86-94] 87  Resp:  [18-19] 18  SpO2:  [93 %-100 %] 96 %  BP: (129-145)/(79-98) 133/79     Weight: 59.4 kg (131 lb)  Body mass index is 21.8 kg/m².    Intake/Output Summary (Last 24 hours) at 2/7/2020 1826  Last data filed at 2/7/2020 1738  Gross per 24 hour   Intake 1080 ml   Output 1950 ml   Net -870 ml      Physical Exam   Constitutional: He is oriented to person, place, and time. He appears well-developed and well-nourished. No distress.   HENT:   Head: Normocephalic.   Mouth/Throat: Oropharynx is clear and moist.   Eyes: Pupils are equal, round, and reactive to light. Conjunctivae are normal. No scleral icterus.   Neck: Normal range of motion. Neck supple. No JVD present.   Cardiovascular: Normal rate, regular rhythm, normal heart sounds and intact distal pulses. Exam reveals no gallop and no friction rub.   No murmur heard.  Pulmonary/Chest: Effort normal. No respiratory distress. He has no wheezes. He has rales.   Mild diffuse crackles   Abdominal: Soft. Bowel sounds are normal. He exhibits no distension. There is no tenderness. There is no rebound and no guarding.   Musculoskeletal: Normal range of motion. He exhibits no edema or tenderness.   Lymphadenopathy:     He has no cervical adenopathy.   Neurological: He is alert and oriented to person, place, and time. He displays normal reflexes. No cranial  nerve deficit or sensory deficit. Coordination normal.   Skin: Skin is warm and dry. Capillary refill takes less than 2 seconds. No rash noted. He is not diaphoretic. No erythema. No pallor.   Psychiatric: He has a normal mood and affect. His behavior is normal. Judgment and thought content normal.   Nursing note and vitals reviewed.      Significant Labs:   BMP:   Recent Labs   Lab 02/06/20 1828  02/07/20  0522   GLU  --    < > 106   NA  --    < > 141   K  --    < > 3.2*   CL  --    < > 99   CO2  --    < > 29   BUN  --    < > 9   CREATININE  --    < > 1.2   CALCIUM  --    < > 8.9   MG 2.3  --   --     < > = values in this interval not displayed.     CBC:   Recent Labs   Lab 02/06/20  1836   WBC 5.91   HGB 11.9*   HCT 37.2*        Cardiac Markers:   Recent Labs   Lab 02/06/20  1836   BNP 2,755*     Lactic Acid: No results for input(s): LACTATE in the last 48 hours.  Lipid Panel:   Recent Labs   Lab 02/06/20  1828   CHOL 196   HDL 35*   LDLCALC 136.8   TRIG 121   CHOLHDL 17.9*     Troponin:   Recent Labs   Lab 02/06/20 1836 02/07/20  0522   TROPONINI 0.046* 0.050*     Urine Studies: No results for input(s): COLORU, APPEARANCEUA, PHUR, SPECGRAV, PROTEINUA, GLUCUA, KETONESU, BILIRUBINUA, OCCULTUA, NITRITE, UROBILINOGEN, LEUKOCYTESUR, RBCUA, WBCUA, BACTERIA, SQUAMEPITHEL, HYALINECASTS in the last 48 hours.    Invalid input(s): TUCKER    Significant Imaging: I have reviewed and interpreted all pertinent imaging results/findings within the past 24 hours.

## 2020-02-08 NOTE — CONSULTS
Pulmonary/Critical Care Consult      Patient name: Mayank Medellin  MRN: [id number]  Date: 02/08/2020    Admit Date: 2/6/2020  Consult Requested By: Machelle Siddiqui MD    Reason for Consult:  COPD    HPI:    The patient is waiting to leave AMA.  He was persuaded to stay until I saw him.  The patient had complete PFTs done in the hospital he which shows severe COPD, air trapping, and a moderate diffusion defect.  The patient is a homeless patient who smokes and has node intention of stopping smoking.  He is not interested in having any bronchodilators.  Now that he has been diuresed he is ready to go.    ROS  General: Feeling well.  Eyes: Vision is good.  ENT:  No sinusitis or pharyngitis.   Heart:: No chest pain or palpitations.  Lungs:  Denies any routine cough or shortness of breath.  GI: No Nausea, vomiting, constipation, diarrhea, or reflux.  : No dysuria, hesitancy, or nocturia.  Skin: No lesions or rashes.  Musculoskeletal: No joint pain or myalgias.  Neuro: No headaches or neuropathy.  Lymph: No edema or adenopathy.  Psych: No anxiety or depression.  Endo: No weight change.    Past Medical History    Past Medical History:   Diagnosis Date    CHF (congestive heart failure)     Hypertension        Past Surgical History    History reviewed. No pertinent surgical history.    Medications (scheduled):      albuterol-ipratropium  3 mL Nebulization Q6H    aspirin  81 mg Oral Daily    atorvastatin  40 mg Oral Daily    enoxaparin  40 mg Subcutaneous Daily    furosemide  20 mg Oral BID    guaiFENesin  600 mg Oral BID    losartan  50 mg Oral Daily    spironolactone  25 mg Oral Daily       Medications (infusions):         Medications (prn):     albuterol sulfate, ondansetron, sodium chloride 0.9%  Review of patient's allergies indicates:   Allergen Reactions    Penicillins      hives         Social History:  He is homeless.       Tobacco:   Social History     Tobacco Use   Smoking Status Current Every Day Smoker  "   Packs/day: 1.50    Years: 45.00    Pack years: 67.50   Smokeless Tobacco Never Used    He smokes daily.                             EtOH:   Social History     Substance and Sexual Activity   Alcohol Use No    Frequency: Never                                Drugs:   Social History     Substance and Sexual Activity   Drug Use No                              Family History:   Family History   Family history unknown: Yes       Physical Exam    Vital signs:  Temp:  [96.4 °F (35.8 °C)-97.7 °F (36.5 °C)]   Pulse:  [82-96]   Resp:  [17-18]   BP: (117-138)/()   SpO2:  [95 %-99 %]     Intake/Output:     Intake/Output Summary (Last 24 hours) at 2/8/2020 1412  Last data filed at 2/8/2020 0330  Gross per 24 hour   Intake 240 ml   Output 1800 ml   Net -1560 ml        BMI: Estimated body mass index is 21.39 kg/m² as calculated from the following:    Height as of this encounter: 5' 5" (1.651 m).    Weight as of this encounter: 58.3 kg (128 lb 8.5 oz).    Physical Exam  GENERAL: Older patient in no distress.  HEENT: Pupils equal and reactive. Extraocular movements intact. Nose intact.      Pharynx moist.  NECK: Supple.   HEART: Regular rate and rhythm. No murmur or gallop auscultated.  LUNGS: Clear to auscultation and percussion anteriorly, posteriorly there are few crackles persisting in the left base.  Lung excursion symmetrical. No change in fremitus.   ABDOMEN: Bowel sounds present. Non-tender, no masses palpated.  : Normal anatomy.  EXTREMITIES: Normal muscle tone and joint movement, no cyanosis or clubbing.   LYMPHATICS: No adenopathy palpated, no edema.  SKIN: Dry, intact, no lesions.   NEURO: Cranial nerves II-XII intact. Motor strength 5/5 bilaterally, upper and lower extremities.  PSYCH: Appropriate affect.    Laboratory    No results for input(s): WBC, RBC, HGB, HCT, PLT, MCV, MCH, MCHC in the last 24 hours.    Recent Labs   Lab 02/08/20  0513   CALCIUM 8.9      K 4.0   CO2 30*   CL 99   BUN 13 "   CREATININE 1.0       No results for input(s): PT, INR, APTT in the last 24 hours.    Recent Labs   Lab 02/08/20  0919   TROPONINI 0.056*     Recent Labs   Lab 02/06/20  1836   BNP 2,755*       Microbiology:       Microbiology Results (last 7 days)     ** No results found for the last 168 hours. **          Radiology    The heart appears borderline mildly enlarged there are mild interstitial infiltrates perihilar and trace thickening of or fluid along the fissures and blunting of the costophrenic sulci.  Appearance consistent with CHF.  Findings similar but slightly more prominent today than on the prior exam      Impression       Findings consistent with mild CHF       Impression/Plan    Severe COPD with air trapping  Tobacco abuse with no plans to stop  Homeless situation which makes obtaining the bronchodilators difficult  Patient is not interested in having any bronchodilators    Unfortunately, I have nothing to add

## 2020-02-08 NOTE — RESPIRATORY THERAPY
02/08/20 1349   Patient Assessment/Suction   Level of Consciousness (AVPU) alert   Respiratory Effort Unlabored   Expansion/Accessory Muscles/Retractions expansion symmetric   All Lung Fields Breath Sounds diminished   Cough Frequency   (when asked)   Cough Type tucker cough;nonproductive   PRE-TX-O2   O2 Device (Oxygen Therapy) room air   SpO2 96 %   Pulse Oximetry Type Intermittent   Pulse 93   Resp 18   Aerosol Therapy   $ Aerosol Therapy Charges Aerosol Treatment   Respiratory Treatment Status (SVN) given   Treatment Route (SVN) mouthpiece   Patient Position (SVN) HOB elevated   Post Treatment Assessment (SVN) breath sounds unchanged   Signs of Intolerance (SVN) none   Breath Sounds Post-Respiratory Treatment   Throughout All Fields Post-Treatment All Fields   Post-treatment Heart Rate (beats/min) 88   Post-treatment Resp Rate (breaths/min) 18

## 2020-02-08 NOTE — HOSPITAL COURSE
60-year-old homeless man around of medications admitted for acute systolic heart failure and blood pressure control.  Was diuresed with Lasix with good urine output and improved symptoms.  Medications adjusted by Cardiology consultant.  Patient felt back to baseline.  Cardiology recommended for pulmonary consult for COPD, unfortunately patient does not want to quit smoking and declined starting new medications.  Patient was discharged with medications per Cardiology recommendations.  He was given a list of local homeless resources and the nurse assisted in calling locations for him.  Unfortunately patient removed his own IV and left the facility before the location called back.  Patient expressed to me on discharge day that he did not plan on filling medications and plans to continue smoking.    Discharge exam  Awake, alert, no apparent distress  Regular rate and rhythm no murmur  Lungs clear to auscultation mild wheeze  Abdomen soft nontender nondistended

## 2020-02-08 NOTE — NURSING
"Let pt know he was going to be discharged. He immediately became irritated, pulling telemetry off, saying "y'all screwed me over. I should have left when I wanted to leave earlier." He stated "I have nowhere to go and I can't get to any of the places." I offered to call the shelters to see if any had means of transportation. Nurse left to room to call. Called [facility] and left voicemail with them. While waiting for a call back, nurse found pt walking out down the back stairs, dressed and packed up. Nurse offered to give him his discharge paperwork and resource information. Pt refused and walked down the stairs. Pt pulled his own IV out. No bleeding after removal. Notified charge nurse.   "

## 2020-02-08 NOTE — ASSESSMENT & PLAN NOTE
Acute on chronic problem-due to noncompliance and running out of medications.  Patient is identified as having Systolic heart failure that is Acute on Chronic. CHF is currently uncontrolled due to Pulmonary edema. Latest ECHO shows ejection fraction of 15-20% . Meds per cardiologist.  Monitor on telemetry. Patient is on CHF pathway.  Monitor strict Is&Os and daily weights.  Place on fluid restriction of 1500ml. Continue to stress to patient importance of self efficacy and  on diet for CHF. Last BNP reviewed- and noted below   Recent Labs   Lab 02/06/20  1836   BNP 2,755*   .

## 2020-02-08 NOTE — ASSESSMENT & PLAN NOTE
Acute on chronic problem-due to noncompliance and running out of medications.  Patient is identified as having Systolic heart failure that is Acute on Chronic. CHF is currently uncontrolled due to Pulmonary edema. Latest ECHO shows ejection fraction of 15-20% . Continue BB and monitor clinical status closely. Monitor on telemetry. Patient is on CHF pathway.  Monitor strict Is&Os and daily weights.  Place on fluid restriction of 1500ml. Continue to stress to patient importance of self efficacy and  on diet for CHF. Last BNP reviewed- and noted below   Recent Labs   Lab 02/06/20  1836   BNP 2,755*   .

## 2020-02-08 NOTE — PROGRESS NOTES
[facility] Hospital Medicine  Progress Note    Patient Name: Mayank Medellin  MRN: [id number]  Patient Class: OP- Observation   Admission Date: 2/6/2020  Length of Stay: 0 days  Attending Physician: Machelle Siddiqui MD  Primary Care Provider: Primary Doctor No        Subjective:     Principal Problem:Acute on chronic systolic (congestive) heart failure        HPI:  Mayank Medellin is a 60-year-old male who presented to the hospital complaining of shortness of breath. He has a previous history of systolic heart failure, hypertension, and noncompliance.  He states he ran out of his Lasix and Aldactone several days ago.  Shortness of breath is worse with minimal exertion.  Symptoms are minimally improved with rest.  He denies chest pain, fever, chills, nausea or vomiting. He is currently homeless.  Emergency room workup demonstrated mild hypokalemia which was replaced.  BNP was greater than 2000.  He was given 60 mg of Lasix in ER.  Chest x-ray with findings of mild CHF.    Overview/Hospital Course:  60-year-old homeless man around of medications admitted for acute systolic heart failure and blood pressure control.    Interval History:  Reports shortness of breath is improved.  Reports his chest feels dry.  Cardiologist recommending pulmonary consult today. Suspecting COPD  Review of Systems   Constitutional: Positive for chills. Negative for fever.   Respiratory: Positive for cough and shortness of breath.    Cardiovascular: Positive for leg swelling. Negative for chest pain.        Dry chest    Gastrointestinal: Negative for abdominal pain and nausea.   Genitourinary: Negative for difficulty urinating and dysuria.   Psychiatric/Behavioral: Negative for agitation and confusion.     Objective:     Vital Signs (Most Recent):  Temp: 96.4 °F (35.8 °C) (02/08/20 1146)  Pulse: 91 (02/08/20 1146)  Resp: 18 (02/08/20 1146)  BP: 117/88 (02/08/20 1146)  SpO2: 95 % (02/08/20 1146) Vital Signs (24h Range):  Temp:   [96.4 °F (35.8 °C)-97.7 °F (36.5 °C)] 96.4 °F (35.8 °C)  Pulse:  [82-96] 91  Resp:  [17-18] 18  SpO2:  [95 %-99 %] 95 %  BP: (117-138)/() 117/88     Weight: 58.3 kg (128 lb 8.5 oz)  Body mass index is 21.39 kg/m².    Intake/Output Summary (Last 24 hours) at 2/8/2020 1257  Last data filed at 2/8/2020 0330  Gross per 24 hour   Intake 240 ml   Output 2200 ml   Net -1960 ml      Physical Exam   Constitutional: He is oriented to person, place, and time. He appears well-developed and well-nourished. No distress.   HENT:   Head: Normocephalic.   Mouth/Throat: Oropharynx is clear and moist.   Eyes: Pupils are equal, round, and reactive to light. Conjunctivae are normal. No scleral icterus.   Neck: Normal range of motion. Neck supple. No JVD present.   Cardiovascular: Normal rate, regular rhythm, normal heart sounds and intact distal pulses. Exam reveals no gallop and no friction rub.   No murmur heard.  Pulmonary/Chest: Effort normal. No respiratory distress. He has no wheezes. He has rales.   Mild diffuse crackles   Abdominal: Soft. Bowel sounds are normal. He exhibits no distension. There is no tenderness. There is no rebound and no guarding.   Musculoskeletal: Normal range of motion. He exhibits no edema or tenderness.   Lymphadenopathy:     He has no cervical adenopathy.   Neurological: He is alert and oriented to person, place, and time. He displays normal reflexes. No cranial nerve deficit or sensory deficit. Coordination normal.   Skin: Skin is warm and dry. Capillary refill takes less than 2 seconds. No rash noted. He is not diaphoretic. No erythema. No pallor.   Psychiatric: He has a normal mood and affect. His behavior is normal. Judgment and thought content normal.   Nursing note and vitals reviewed.      Significant Labs: All pertinent labs within the past 24 hours have been reviewed.    Significant Imaging: I have reviewed and interpreted all pertinent imaging results/findings within the past 24  hours.      Assessment/Plan:      * Acute on chronic systolic (congestive) heart failure  Acute on chronic problem-due to noncompliance and running out of medications.  Patient is identified as having Systolic heart failure that is Acute on Chronic. CHF is currently uncontrolled due to Pulmonary edema. Latest ECHO shows ejection fraction of 15-20% . Meds per cardiologist.  Monitor on telemetry. Patient is on CHF pathway.  Monitor strict Is&Os and daily weights.  Place on fluid restriction of 1500ml. Continue to stress to patient importance of self efficacy and  on diet for CHF. Last BNP reviewed- and noted below   Recent Labs   Lab 02/06/20 1836   BNP 2,755*   .          Homelessness  Chronic problem  Consult       Noncompliance with medication regimen  Chronic problem  Stress the importance of follow-up to  Stressed the importance of medication regimen      Hypokalemia  Acute problem  Patient has hypokalemia which is currently controlled. Last electrolytes reviewed-   Recent Labs   Lab 02/06/20  1836 02/07/20  0522 02/08/20  0513   K 3.1* 3.2* 4.0   . Will replace potassium and monitor electrolytes closely.       Dependence on nicotine from cigarettes  Chronic problem  Dangers of cigarette smoking were reviewed with patient in detail for 10 minutes and patient was encouraged to quit. Nicotine replacement options were discussed.      Mixed hyperlipidemia  Chronic problem   Patient is chronically on statin.will continue for now. Monitor clinically. Last LDL was   Lab Results   Component Value Date    LDLCALC 136.8 02/06/2020          Essential hypertension  Chronic problem  Chronic, controlled.  Latest blood pressure and vitals reviewed-   Temp:  [96.4 °F (35.8 °C)-97.7 °F (36.5 °C)]   Pulse:  [82-96]   Resp:  [17-18]   BP: (117-138)/()   SpO2:  [95 %-99 %] .   Home meds for hypertension were reviewed and noted below. Hospital anti-hypertensive changes were made as shown  below.  Hypertension Medications             carvedilol (COREG) 3.125 MG tablet Take 1 tablet (3.125 mg total) by mouth 2 (two) times daily with meals.    furosemide (LASIX) 20 MG tablet Take 20 mg by mouth 2 (two) times daily.    losartan (COZAAR) 25 MG tablet Take 0.5 tablets (12.5 mg total) by mouth once daily.    spironolactone (ALDACTONE) 25 MG tablet Take 25 mg by mouth once daily.      Hospital Medications             furosemide injection 20 mg 20 mg, Intravenous, Once    furosemide injection 60 mg (Completed) 60 mg, Intravenous, ED 1 Time    losartan tablet 25 mg Starting on 2/7/2020. 25 mg, Oral, Daily    metoprolol succinate (TOPROL-XL) 24 hr tablet 25 mg Starting on 2/7/2020. 25 mg, Oral, Daily, DO NOT CRUSH OR CHEW; SWALLOW WHOLE.        Will utilize p.r.n. blood pressure medication only if patient's blood pressure greater than  180/110 and he develops symptoms such as worsening chest pain or shortness of breath.        VTE Risk Mitigation (From admission, onward)         Ordered     enoxaparin injection 40 mg  Daily      02/06/20 2128     IP VTE HIGH RISK PATIENT  Once      02/06/20 2128     Place LEXI hose  Until discontinued      02/06/20 2128                      Machelle Siddiqui MD  Department of Hospital Medicine   [facility]

## 2020-02-08 NOTE — DISCHARGE INSTRUCTIONS
Thank you for choosing [facility] for your medical care. The primary doctor who is taking care of you at the time of your discharge is Machelle Siddiqui MD.     You were admitted to the hospital with Acute on chronic systolic (congestive) heart failure.     Please note your discharge instructions, including diet/activity restrictions, follow-up appointments, and medication changes.  If you have any questions about your medical issues, prescriptions, or any other questions, please feel free to contact the [facility] Medicine Dept at [phone] and we will help.    If you are previously with Home health, outpatient PT/OT or under a therapy program, you are cleared to return to those programs.    Please direct all long term medication refills and follow up to your primary care provider, Primary Doctor No. Thank you again for letting us take care of your health care needs.    Please note the following discharge instructions per your discharging physician-  Restrict your salt intake  Drink less than 1.5L fluid a day  STOP SMOKING

## 2020-02-08 NOTE — ASSESSMENT & PLAN NOTE
Chronic problem  Chronic, controlled.  Latest blood pressure and vitals reviewed-   Temp:  [96.2 °F (35.7 °C)-98.1 °F (36.7 °C)]   Pulse:  [86-94]   Resp:  [18-19]   BP: (129-145)/(79-98)   SpO2:  [93 %-100 %] .   Home meds for hypertension were reviewed and noted below. Hospital anti-hypertensive changes were made as shown below.  Hypertension Medications             carvedilol (COREG) 3.125 MG tablet Take 1 tablet (3.125 mg total) by mouth 2 (two) times daily with meals.    furosemide (LASIX) 20 MG tablet Take 20 mg by mouth 2 (two) times daily.    losartan (COZAAR) 25 MG tablet Take 0.5 tablets (12.5 mg total) by mouth once daily.    spironolactone (ALDACTONE) 25 MG tablet Take 25 mg by mouth once daily.      Hospital Medications             furosemide injection 20 mg 20 mg, Intravenous, Once    furosemide injection 60 mg (Completed) 60 mg, Intravenous, ED 1 Time    losartan tablet 25 mg Starting on 2/7/2020. 25 mg, Oral, Daily    metoprolol succinate (TOPROL-XL) 24 hr tablet 25 mg Starting on 2/7/2020. 25 mg, Oral, Daily, DO NOT CRUSH OR CHEW; SWALLOW WHOLE.        Will utilize p.r.n. blood pressure medication only if patient's blood pressure greater than  180/110 and he develops symptoms such as worsening chest pain or shortness of breath.

## 2020-02-09 NOTE — DISCHARGE SUMMARY
[facility] Hospital Medicine  Discharge Summary      Patient Name: Mayank Medellin  MRN: [id number]  Admission Date: 2/6/2020  Hospital Length of Stay: 0 days  Discharge Date and Time: 2/8/2020  3:37 PM  Attending Physician: Gabriela att. providers found   Discharging Provider: Machelle Siddiqui MD  Primary Care Provider: Primary Doctor Gabriela      HPI:   Mayank Medellin is a 60-year-old male who presented to the hospital complaining of shortness of breath. He has a previous history of systolic heart failure, hypertension, and noncompliance.  He states he ran out of his Lasix and Aldactone several days ago.  Shortness of breath is worse with minimal exertion.  Symptoms are minimally improved with rest.  He denies chest pain, fever, chills, nausea or vomiting. He is currently homeless.  Emergency room workup demonstrated mild hypokalemia which was replaced.  BNP was greater than 2000.  He was given 60 mg of Lasix in ER.  Chest x-ray with findings of mild CHF.    * No surgery found *      Hospital Course:   60-year-old homeless man around of medications admitted for acute systolic heart failure and blood pressure control.  Was diuresed with Lasix with good urine output and improved symptoms.  Medications adjusted by Cardiology consultant.  Patient felt back to baseline.  Cardiology recommended for pulmonary consult for COPD, unfortunately patient does not want to quit smoking and declined starting new medications.  Patient was discharged with medications per Cardiology recommendations.  He was given a list of local homeless resources and the nurse assisted in calling locations for him.  Unfortunately patient removed his own IV and left the facility before the location called back.  Patient expressed to me on discharge day that he did not plan on filling medications and plans to continue smoking.    Discharge exam  Awake, alert, no apparent distress  Regular rate and rhythm no murmur  Lungs clear to auscultation mild  wheeze  Abdomen soft nontender nondistended       Consults:   Consults (From admission, onward)        Status Ordering Provider     Inpatient consult to Cardiology  Once     Provider:  Amos Alvarez MD    Completed BETRA PALOMO     Inpatient consult to Pulmonology  Once     Provider:  Andree Woods MD    Completed ROBERTO MEDINA     Inpatient consult to Registered Dietitian/Nutritionist  Once     Provider:  (Not yet assigned)    Completed BERTA PALOMO     Inpatient consult to Social Work/Case Management  Once     Provider:  (Not yet assigned)    Completed BERTA PALOMO     Inpatient consult to Social Work/Case Management  Once     Provider:  (Not yet assigned)    Completed RICHARDSON HERNANDEZ new Assessment & Plan notes have been filed under this hospital service since the last note was generated.  Service: Hospital Medicine    Final Active Diagnoses:    Diagnosis Date Noted POA    PRINCIPAL PROBLEM:  Acute on chronic systolic (congestive) heart failure [I50.23] 02/02/2019 Yes    Dependence on nicotine from cigarettes [F17.210] 02/06/2020 Yes    Hypokalemia [E87.6] 02/06/2020 Yes    Noncompliance with medication regimen [Z91.14] 02/06/2020 Not Applicable    Homelessness [Z59.0] 02/06/2020 Not Applicable    Essential hypertension [I10] 02/02/2019 Yes    Mixed hyperlipidemia [E78.2] 02/02/2019 Yes      Problems Resolved During this Admission:       Discharged Condition: good    Disposition: Home or Self Care    Follow Up:  Follow-up Information     [facility] In 3 days.    Contact information:  [address] [id number]  [phone]                 Patient Instructions:      Notify your health care provider if you experience any of the following:  difficulty breathing or increased cough     Notify your health care provider if you experience any of the following:  increased confusion or weakness     Notify your health care provider if you experience any of  the following:  persistent dizziness, light-headedness, or visual disturbances     Activity as tolerated       Significant Diagnostic Studies: Labs:   BMP:   Recent Labs   Lab 02/08/20 0513         K 4.0   CL 99   CO2 30*   BUN 13   CREATININE 1.0   CALCIUM 8.9   , CMP   Recent Labs   Lab 02/08/20  0513      K 4.0   CL 99   CO2 30*      BUN 13   CREATININE 1.0   CALCIUM 8.9   ANIONGAP 7*   ESTGFRAFRICA >60   EGFRNONAA >60    and CBC No results for input(s): WBC, HGB, HCT, PLT in the last 48 hours.    Pending Diagnostic Studies:     Procedure Component Value Units Date/Time    EKG 12-lead [[id number]]     Order Status:  Sent Lab Status:  No result        Radiology Results (last 7 days)     Procedure Component Value Units Date/Time   X-Ray Chest PA And Lateral [[id number]] Resulted: 02/06/20 1852   Order Status: Completed Updated: 02/06/20 1855   Narrative:     EXAMINATION:  XR CHEST PA AND LATERAL    CLINICAL HISTORY:  Shortness of breath    TECHNIQUE:  PA and lateral views of the chest were performed.    COMPARISON:  02/02/2019    FINDINGS:  The heart appears borderline mildly enlarged there are mild interstitial infiltrates perihilar and trace thickening of or fluid along the fissures and blunting of the costophrenic sulci.  Appearance consistent with CHF.  Findings similar but slightly more prominent today than on the prior exam   Impression:       Findings consistent with mild CHF      Electronically signed by: Hermelinda Todd MD  Date: 02/06/2020  Time: 18:52     Radiology Results (last 7 days)     Procedure Component Value Units Date/Time   X-Ray Chest PA And Lateral [[id number]] Resulted: 02/06/20 1852   Order Status: Completed Updated: 02/06/20 1855   Narrative:     EXAMINATION:  XR CHEST PA AND LATERAL    CLINICAL HISTORY:  Shortness of breath    TECHNIQUE:  PA and lateral views of the chest were performed.    COMPARISON:  02/02/2019    FINDINGS:  The heart appears borderline mildly  enlarged there are mild interstitial infiltrates perihilar and trace thickening of or fluid along the fissures and blunting of the costophrenic sulci.  Appearance consistent with CHF.  Findings similar but slightly more prominent today than on the prior exam   Impression:       Findings consistent with mild CHF      Electronically signed by: Hermelinda Todd MD  Date: 02/06/2020  Time: 18:52       Medications:  Reconciled Home Medications:      Medication List      CHANGE how you take these medications    losartan 50 MG tablet  Commonly known as:  COZAAR  Take 1 tablet (50 mg total) by mouth once daily.  What changed:    · medication strength  · how much to take        CONTINUE taking these medications    aspirin 81 MG EC tablet  Commonly known as:  ECOTRIN  Take 81 mg by mouth once daily.     atorvastatin 40 MG tablet  Commonly known as:  LIPITOR  Take 40 mg by mouth once daily.     furosemide 20 MG tablet  Commonly known as:  LASIX  Take 20 mg by mouth 2 (two) times daily.     spironolactone 25 MG tablet  Commonly known as:  ALDACTONE  Take 25 mg by mouth once daily.        STOP taking these medications    carvediloL 3.125 MG tablet  Commonly known as:  COREG            Indwelling Lines/Drains at time of discharge:   Lines/Drains/Airways     None                 Time spent on the discharge of patient: 35 minutes  Patient was seen and examined on the date of discharge and determined to be suitable for discharge.         Machelle Siddiqui MD  Department of Hospital Medicine  [facility]

## 2020-02-10 LAB
BRPFT: ABNORMAL
DLCO ADJ PRE: 10.68 ML/(MIN*MMHG) (ref 18.98–32.83)
DLCO SINGLE BREATH LLN: 18.98
DLCO SINGLE BREATH PRE REF: 41.2 %
DLCO SINGLE BREATH REF: 25.9
DLCOC SBVA LLN: 2.76
DLCOC SBVA PRE REF: 77.6 %
DLCOC SBVA REF: 4.08
DLCOC SINGLE BREATH LLN: 18.98
DLCOC SINGLE BREATH PRE REF: 41.2 %
DLCOC SINGLE BREATH REF: 25.9
DLCOVA LLN: 2.76
DLCOVA PRE REF: 77.6 %
DLCOVA PRE: 3.17 ML/(MIN*MMHG*L) (ref 2.76–5.41)
DLCOVA REF: 4.08
DLVAADJ PRE: 3.17 ML/(MIN*MMHG*L) (ref 2.76–5.41)
ERVN2 LLN: 1.09
ERVN2 PRE REF: 87.9 %
ERVN2 PRE: 0.96 L (ref 1.09–1.09)
ERVN2 REF: 1.09
FEF 25 75 CHG: 73.8 %
FEF 25 75 LLN: 1.33
FEF 25 75 POST REF: 37.3 %
FEF 25 75 PRE REF: 21.5 %
FEF 25 75 REF: 2.71
FET100 CHG: -6.8 %
FEV1 CHG: 20.8 %
FEV1 FVC CHG: 19.9 %
FEV1 FVC LLN: 66
FEV1 FVC POST REF: 86 %
FEV1 FVC PRE REF: 71.7 %
FEV1 FVC REF: 78
FEV1 LLN: 2.38
FEV1 POST REF: 56.3 %
FEV1 PRE REF: 46.6 %
FEV1 REF: 3.16
FRCN2 LLN: 2.39
FRCN2 PRE REF: 184.3 %
FRCN2 REF: 3.38
FVC CHG: 0.8 %
FVC LLN: 3.09
FVC POST REF: 65.4 %
FVC PRE REF: 64.9 %
FVC REF: 4.05
IVC PRE: 2.02 L (ref 3.09–5.01)
IVC SINGLE BREATH LLN: 3.09
IVC SINGLE BREATH PRE REF: 49.8 %
IVC SINGLE BREATH REF: 4.05
MVV LLN: 102
MVV PRE REF: 41.8 %
MVV REF: 120
PEF CHG: 42.8 %
PEF LLN: 5.9
PEF POST REF: 55.2 %
PEF PRE REF: 38.7 %
PEF REF: 8.27
POST FEF 25 75: 1.01 L/S (ref 1.33–4.09)
POST FET 100: 9.69 SEC
POST FEV1 FVC: 67.23 % (ref 66.06–90.3)
POST FEV1: 1.78 L (ref 2.38–3.94)
POST FVC: 2.65 L (ref 3.09–5.01)
POST PEF: 4.57 L/S (ref 5.9–10.64)
PRE DLCO: 10.68 ML/(MIN*MMHG) (ref 18.98–32.83)
PRE FEF 25 75: 0.58 L/S (ref 1.33–4.09)
PRE FET 100: 10.39 SEC
PRE FEV1 FVC: 56.09 % (ref 66.06–90.3)
PRE FEV1: 1.47 L (ref 2.38–3.94)
PRE FRC N2: 6.23 L
PRE FVC: 2.63 L (ref 3.09–5.01)
PRE MVV: 50 L/MIN (ref 101.8–137.72)
PRE PEF: 3.2 L/S (ref 5.9–10.64)
RVN2 LLN: 1.62
RVN2 PRE REF: 205.6 %
RVN2 PRE: 4.71 L (ref 1.62–2.97)
RVN2 REF: 2.29
RVN2TLCN2 LLN: 28.38
RVN2TLCN2 PRE REF: 171.9 %
RVN2TLCN2 PRE: 64.2 % (ref 28.38–46.34)
RVN2TLCN2 REF: 37.36
TLCN2 LLN: 5.19
TLCN2 PRE REF: 115.7 %
TLCN2 PRE: 7.34 L (ref 5.19–7.49)
TLCN2 REF: 6.34
VA PRE: 3.39 L (ref 6.19–6.19)
VA SINGLE BREATH LLN: 6.19
VA SINGLE BREATH PRE REF: 54.7 %
VA SINGLE BREATH REF: 6.19
VCMAXN2 LLN: 3.09
VCMAXN2 PRE REF: 64.9 %
VCMAXN2 PRE: 2.63 L (ref 3.09–5.01)
VCMAXN2 REF: 4.05
